# Patient Record
Sex: FEMALE | Race: BLACK OR AFRICAN AMERICAN | Employment: PART TIME | URBAN - METROPOLITAN AREA
[De-identification: names, ages, dates, MRNs, and addresses within clinical notes are randomized per-mention and may not be internally consistent; named-entity substitution may affect disease eponyms.]

---

## 2017-08-14 ENCOUNTER — OFFICE VISIT (OUTPATIENT)
Dept: FAMILY MEDICINE | Facility: CLINIC | Age: 22
End: 2017-08-14

## 2017-08-14 VITALS
HEART RATE: 76 BPM | RESPIRATION RATE: 16 BRPM | BODY MASS INDEX: 20.13 KG/M2 | WEIGHT: 132.4 LBS | OXYGEN SATURATION: 96 % | SYSTOLIC BLOOD PRESSURE: 121 MMHG | DIASTOLIC BLOOD PRESSURE: 73 MMHG | TEMPERATURE: 98.6 F

## 2017-08-14 DIAGNOSIS — L81.1 MELASMA: ICD-10-CM

## 2017-08-14 DIAGNOSIS — K59.04 CHRONIC IDIOPATHIC CONSTIPATION: Primary | ICD-10-CM

## 2017-08-14 RX ORDER — HYDROQUINONE 40 MG/G
CREAM TOPICAL 2 TIMES DAILY
Qty: 30 G | Refills: 3 | Status: SHIPPED | OUTPATIENT
Start: 2017-08-14 | End: 2018-03-16

## 2017-08-14 RX ORDER — POLYETHYLENE GLYCOL 3350 17 G/17G
1 POWDER, FOR SOLUTION ORAL DAILY
Qty: 510 G | Refills: 1 | Status: SHIPPED | OUTPATIENT
Start: 2017-08-14 | End: 2018-11-07

## 2017-08-14 NOTE — MR AVS SNAPSHOT
After Visit Summary   8/14/2017    Lissy Foote    MRN: [id number]           Patient Information     Date Of Birth          1995        Visit Information        Provider Department      8/14/2017 3:00 PM Herbie Espinoza MD [facility]        Today's Diagnoses     Chronic idiopathic constipation    -  1    Melasma          Care Instructions    Increase water especially in the morning.  Give some time to go to the bathroom.  Eat more fruits like prunes, pears, plums, and peaches--grapes, raisins, apples are good too.  Take Miralax with Tea every morning for two months.   Use Melasma cream twice a day.            Follow-ups after your visit        Who to contact     Please call your clinic at [phone] to:    Ask questions about your health    Make or cancel appointments    Discuss your medicines    Learn about your test results    Speak to your doctor   If you have compliments or concerns about an experience at your clinic, or if you wish to file a complaint, please contact [facility] Patient Relations at [phone] or email us at [email or web address]         Additional Information About Your Visit        MyChart Information     DoubleBeam gives you secure access to your electronic health record. If you see a primary care provider, you can also send messages to your care team and make appointments. If you have questions, please call your primary care clinic.  If you do not have a primary care provider, please call [phone] and they will assist you.      DoubleBeam is an electronic gateway that provides easy, online access to your medical records. With DoubleBeam, you can request a clinic appointment, read your test results, renew a prescription or communicate with your care team.     To access your existing account, please contact your [facility] or call [phone] for assistance.        Care EveryWhere ID      This is your Care EveryWhere ID. This could be used by other organizations to access your [place] medical records  [id number]        Your Vitals Were     Pulse Temperature Respirations Last Period Pulse Oximetry BMI (Body Mass Index)    76 98.6  F (37  C) (Oral) 16 08/08/2017 (Exact Date) 96% 20.13 kg/m2       Blood Pressure from Last 3 Encounters:   08/14/17 121/73   10/20/16 114/72   08/15/16 137/76    Weight from Last 3 Encounters:   08/14/17 132 lb 6.4 oz (60.1 kg)   10/20/16 126 lb (57.2 kg) (47 %)*   08/15/16 123 lb (55.8 kg) (42 %)*     * Growth percentiles are based on [facility] 2-20 Years data.              Today, you had the following     No orders found for display         Today's Medication Changes          These changes are accurate as of: 8/14/17  3:37 PM.  If you have any questions, ask your nurse or doctor.               Start taking these medicines.        Dose/Directions    hydroquinone 4 % Crea   Used for:  Melasma   Started by:  Herbie Espinoza MD        Externally apply topically 2 times daily   Quantity:  30 g   Refills:  3       polyethylene glycol powder   Commonly known as:  MIRALAX   Used for:  Chronic idiopathic constipation   Started by:  Herbie Espinoza MD        Dose:  1 capful   Take 17 g (1 capful) by mouth daily   Quantity:  510 g   Refills:  1            Where to get your medicines      These medications were sent to [facility] [address] [place] [facility] [address] [facility]  [address] [facility] [address]     Phone:  [phone]     hydroquinone 4 % Crea    polyethylene glycol powder                Primary Care Provider Office Phone # Fax #    Bria Kingsley [phone]       [address] [facility] [address]        Equal Access to Services     BRISA ESPINOZA : Pradip Bartlett, bebeto guy, anne-marie thorne. So [facility] [phone].    ATENCIÓN: Si habla español, tiene a mendoza disposición  servicios gratuitos de asistencia lingüística. Pan stroud [phone].    We comply with applicable federal civil rights laws and [place] laws. We do not discriminate on the basis of race, color, national origin, age, disability sex, sexual orientation or gender identity.            Thank you!     Thank you for choosing [facility]  for your care. Our goal is always to provide you with excellent care. Hearing back from our patients is one way we can continue to improve our services. Please take a few minutes to complete the written survey that you may receive in the mail after your visit with us. Thank you!             Your Updated Medication List - Protect others around you: Learn how to safely use, store and throw away your medicines at www.disposemymeds.org.          This list is accurate as of: 8/14/17  3:37 PM.  Always use your most recent med list.                   Brand Name Dispense Instructions for use Diagnosis    hydroquinone 4 % Crea     30 g    Externally apply topically 2 times daily    Melasma       polyethylene glycol powder    MIRALAX    510 g    Take 17 g (1 capful) by mouth daily    Chronic idiopathic constipation       psyllium 0.52 G capsule     100 capsule    Take 1 capsule (0.52 g) by mouth daily    Irritable bowel syndrome with both constipation and diarrhea       SUMAtriptan 25 MG tablet    IMITREX    18 tablet    Take 1-2 tablets (25-50 mg) by mouth at onset of headache for migraine May repeat dose in 2 hours.  Do not exceed 200 mg in 24 hours    Migraine without aura and without status migrainosus, not intractable

## 2017-08-14 NOTE — PATIENT INSTRUCTIONS
Increase water especially in the morning.  Give some time to go to the bathroom.  Eat more fruits like prunes, pears, plums, and peaches--grapes, raisins, apples are good too.  Take Miralax with Tea every morning for two months.   Use Melasma cream twice a day.

## 2017-08-14 NOTE — PROGRESS NOTES
HPI:       Lissy Foote is a 22 year old who presents for the following  Patient presents with:  Constipation: the last 2 weeks have been bad per patient. but she is having 3-4 bowl movements a week. Bloating after she eats over the last 2 months  Derm Problem: dark skin around her mouth. Dr. Kingsley told her to come in and talk about a skin cream to even out her skin tone.       Constipation     Onset: 2 weeks    Description:   Frequency of bowel movements: Every 1 days.  Stool consistency: hard    Progression of Symptoms:  worsening    Accompanying Signs & Symptoms:  Abdominal pain (cramping?):  YES feels distended the first part of the day    Blood in stool:  No   Rectal pain:  No   Nausea/vomiting:  No   Weight loss or gain:  No     History:   History of abdominal surgery: No     Precipitating factors:   Recent use of narcotics, anticholinergics, calcium channel blockers, antacids, or iron supplements:  No   Any other new medication?  No   Chronic Laxative Use:  No   Fruits of Vegetables per day  0  Therapies Tried and outcome: none         Concern: melasma   Description of the problem :had darkining around mouth for several years.  Wanted to see Derm but Dr Wild recommended a trial of a melasma medication first.  Unchanged issue.  No new constitutional symptoms--no weightloss or gain, fatigue, lactorrhea, change in periods or other concern.         Problem, Medication and Allergy Lists were reviewed and are current.  Ongoing issue with poor weight gain.  Improved recently.  Drink ensure frequently.  Likes cereal with milk and likes pizza.  Eating few veg or fruit.  Is taller than her parents.    Patient is an established patient of this clinic.         Review of Systems:   Review of Systems   As above       Physical Exam:   Patient Vitals for the past 24 hrs:   BP Temp Temp src Pulse Resp SpO2 Weight   08/14/17 1510 121/73 98.6  F (37  C) Oral 76 16 96 % 132 lb 6.4 oz (60.1 kg)     Body mass index is  20.13 kg/(m^2).  Vitals were reviewed and were normal     Physical Exam   Constitutional: She appears well-developed. No distress.   thin   HENT:   Head: Normocephalic and atraumatic.   Mouth/Throat: Oropharynx is clear and moist.   Eyes: Pupils are equal, round, and reactive to light. Right eye exhibits no discharge. Left eye exhibits no discharge.   Neck: Normal range of motion.   Cardiovascular: Normal rate, regular rhythm and normal heart sounds.  Exam reveals no gallop and no friction rub.    No murmur heard.  Pulmonary/Chest: Effort normal and breath sounds normal. No respiratory distress. She has no wheezes. She has no rales. She exhibits no tenderness.   Abdominal: Soft. Bowel sounds are normal. She exhibits no distension and no mass. There is no tenderness. There is no guarding.   Musculoskeletal: Normal range of motion.   Neurological: She is alert. No cranial nerve deficit.   Skin: Skin is warm and dry. Rash: does have some darkening of skin around mouth--no erythema. She is not diaphoretic. No erythema.         Results:     Results from last visit:  Orders Only on 08/16/2016   Component Date Value Ref Range Status     Specimen Description 08/16/2016 Feces   Final     H Pylori Antigen 08/16/2016    Final                    Value:Negative for Helicobacter pylori antigen by enzyme immunoassay. A negative   result indicates the absence of H. pylori antigen or that the level of antigen   is below the level of detection.       Micro Report Status 08/16/2016 FINAL 08/17/2016   Final     Assessment and Plan     1. Chronic idiopathic constipation  Diet changes recommended.  More water and fruit, miralax x 2 months.    - polyethylene glycol (MIRALAX) powder; Take 17 g (1 capful) by mouth daily  Dispense: 510 g; Refill: 1    2. Melasma  - hydroquinone 4 % CREA; Externally apply topically 2 times daily  Dispense: 30 g; Refill: 3  Will see how things go over next 2-4 weeks--if not improving will consider specialist  referral.  There are no discontinued medications.  Options for treatment and follow-up care were reviewed with the patient. Lissy Foote  engaged in the decision making process and verbalized understanding of the options discussed and agreed with the final plan.    Herbie Espinoza MD

## 2018-01-10 ENCOUNTER — MYC MEDICAL ADVICE (OUTPATIENT)
Dept: FAMILY MEDICINE | Facility: CLINIC | Age: 23
End: 2018-01-10

## 2018-01-10 DIAGNOSIS — M21.169 BOWING OF LEG, UNSPECIFIED LATERALITY: Primary | ICD-10-CM

## 2018-02-06 ENCOUNTER — OFFICE VISIT (OUTPATIENT)
Dept: FAMILY MEDICINE | Facility: CLINIC | Age: 23
End: 2018-02-06
Payer: COMMERCIAL

## 2018-02-06 ENCOUNTER — RADIANT APPOINTMENT (OUTPATIENT)
Dept: GENERAL RADIOLOGY | Facility: CLINIC | Age: 23
End: 2018-02-06
Attending: FAMILY MEDICINE
Payer: COMMERCIAL

## 2018-02-06 VITALS
RESPIRATION RATE: 16 BRPM | WEIGHT: 134.4 LBS | DIASTOLIC BLOOD PRESSURE: 80 MMHG | TEMPERATURE: 98.1 F | SYSTOLIC BLOOD PRESSURE: 115 MMHG | BODY MASS INDEX: 20.44 KG/M2 | HEART RATE: 70 BPM | OXYGEN SATURATION: 100 %

## 2018-02-06 DIAGNOSIS — M79.604 PAIN OF RIGHT LOWER EXTREMITY: Primary | ICD-10-CM

## 2018-02-06 DIAGNOSIS — M79.604 PAIN OF RIGHT LOWER EXTREMITY: ICD-10-CM

## 2018-02-06 DIAGNOSIS — R29.898 WEAKNESS OF BOTH HIPS: ICD-10-CM

## 2018-02-06 DIAGNOSIS — Z13.21 ENCOUNTER FOR VITAMIN DEFICIENCY SCREENING: ICD-10-CM

## 2018-02-06 DIAGNOSIS — D50.8 IRON DEFICIENCY ANEMIA SECONDARY TO INADEQUATE DIETARY IRON INTAKE: ICD-10-CM

## 2018-02-06 LAB
DEPRECATED CALCIDIOL+CALCIFEROL SERPL-MC: 28 UG/L (ref 20–75)
HCT VFR BLD AUTO: 34.8 % (ref 35–47)
HEMOGLOBIN: 10.6 G/DL (ref 11.7–15.7)
MCH RBC QN AUTO: 21.9 PG (ref 26.5–35)
MCHC RBC AUTO-ENTMCNC: 30.5 G/DL (ref 32–36)
MCV RBC AUTO: 71.9 FL (ref 78–100)
PLATELET # BLD AUTO: 234 K/UL (ref 150–450)
RBC # BLD AUTO: 4.84 M/UL (ref 3.8–5.2)
WBC # BLD AUTO: 5.2 K/UL (ref 4–11)

## 2018-02-06 NOTE — PROGRESS NOTES
SUBJECTIVE: Lissy Foote is a 22 year old female who reports getting right leg pain at times.  Told PMD that she's bowlegged, referral to sports med.  Deep into right lower leg.  Randomly happens, left LE only once or twice.  Goes doesn't last long, pain only a little bit.  No accident or injury.  Had braces for bowlegged legs as a child.  Walking- doesn't walk in a straight line.  Very young, had at [facility].  Born in [place], here in [place] since age 3.  Started today after sitting in exam room for short time.  Uncertain if she has had leg surgery.  [facility] [place] - signed release.  Legs start shaking with just sitting at times.  Taking vitamins for iron def.  Legs move around in class but not at night.  Told she had growing pains in the past, but now not growing any longer.  No cold sensation to legs, no rashes, no swelling.  Grandma with arthritis.    PAST MEDICAL, SOCIAL, SURGICAL AND FAMILY HISTORY: She  has no past medical history of Cancer (H); Diabetes (H); Thyroid disease; or Uncomplicated asthma.  She  has no past surgical history on file.  Her family history includes Thyroid Disease in her maternal grandmother and mother.  She reports that she has never smoked. She has never used smokeless tobacco. She reports that she does not drink alcohol or use illicit drugs.      ALLERGIES: She has No Known Allergies.    CURRENT MEDICATIONS: She has a current medication list which includes the following prescription(s): polyethylene glycol, hydroquinone, psyllium, and sumatriptan.     REVIEW OF SYSTEMS: 10 point review of systems is negative except as noted above.    EXAM:  /80  Pulse 70  Temp 98.1  F (36.7  C) (Oral)  Resp 16  Wt 134 lb 6.4 oz (61 kg)  SpO2 100%  BMI 20.44 kg/m2  CONSTITUTIONIAL: healthy, alert, no distress and cooperative  HEAD: Normocephalic. No masses, lesions, tenderness or abnormalities  ENT: ENT exam normal, no neck nodes or sinus tenderness  SKIN: no suspicious lesions or rashes  GAIT: normal and  mild valgus  Stance: normal and genu valgum  NEUROLOGIC: Normal muscle tone and strength, reflexes normal, sensation grossly normal.  PSYCHIATRIC: affect normal/bright and mentation appears normal.    MUSCULOSKELETAL: R>L leg pain  Inspection; no swelling, no ecchymosis, no deformity  Palpation: Tender: denies  Non-tender: proximal 1/3 tibia, middle 1/3 tibia, distal 1/3 tibia  Strength: gastrocsoleus: 5/5, anterior tibialis:  5/5, peroneals: 5/5  Special tests: negative hop test  Bilateral hip weakness- single leg squat    ASSESSMENT/PLAN:  Pt is a 21 yo [place] female with PMHx of lower extremity bracing as a child presenting with right lower leg pain  1. Pain in right lower extremity- denies RLS, no bony abnormality on x-ray  2. Bilateral hip weakness- needs glute strengthening and exercise program created  She is lacking necessary strength. Strongly encouraged PT  3. Iron def- CBC, still needs to have iron in her diet, on vitamins, we know this isn't absorbed well, vit C in form of OJ with iron on empty stomach  4. Vit D def in 2016- will check current levels  5. Pt concerned about leg alignment- getting records from [facility], not in care everywhere for some reason, she has stopped growing at this point, I would work on strengthening hips, glutes and leg musculature    Encounter Diagnoses   Name Primary?     Pain of right lower extremity Yes     Iron deficiency anemia secondary to inadequate dietary iron intake      Encounter for vitamin deficiency screening      Weakness of both hips      RTC 2 months    X-RAY INTERPRETATION:   X-Ray of the Right Tibia/Fibula: 2-view, ap, lateral   ordered and interpreted in the office today was negative for fracture, subluxation or joint space abnormality.

## 2018-02-06 NOTE — MR AVS SNAPSHOT
After Visit Summary   2/6/2018    Lissy Foote    MRN: [id number]           Patient Information     Date Of Birth          1995        Visit Information        Provider Department      2/6/2018 8:00 AM Ernestine Gorman MD [facility]        Today's Diagnoses     Pain of right lower extremity    -  1    Iron deficiency anemia secondary to inadequate dietary iron intake        Encounter for vitamin deficiency screening        Weakness of both hips           Follow-ups after your visit        Additional Services     [facility], PT, HAND AND CHIROPRACTIC REFERRAL - [facility]       **This order will print in the [facility] Scheduling Office**    Physical Therapy, Hand Therapy and Chiropractic Care are available through:    *[facility]  *[facility]  *[facility]    Call one number to schedule at any of the above locations: [phone].    Your provider has referred you to: Physical Therapy at [facility] or [facility]    Indication/Reason for Referral: Right > Left LE pain  Onset of Illness: on-going  Therapy Orders: Evaluate and Treat  Special Programs: None and Video Analysis  Special Request: None    Trevon Lester      Additional Comments for the Therapist or Chiropractor: history of leg braces as a child, [facility]    Please be aware that coverage of these services is subject to the terms and limitations of your health insurance plan.  Call member services at your health plan with any benefit or coverage questions.      Please bring the following to your appointment:    *Your personal calendar for scheduling future appointments  *Comfortable clothing                  Who to contact     Please call your clinic at [phone] to:    Ask questions about your health    Make or cancel appointments    Discuss your medicines    Learn about your test results    Speak to your doctor   If you have compliments or concerns about an experience  at your clinic, or if you wish to file a complaint, please contact [facility] Patient Relations at [phone] or email us at [email or web address]         Additional Information About Your Visit        Cydcorhart Information     Roadster gives you secure access to your electronic health record. If you see a primary care provider, you can also send messages to your care team and make appointments. If you have questions, please call your primary care clinic.  If you do not have a primary care provider, please call [phone] and they will assist you.      Roadster is an electronic gateway that provides easy, online access to your medical records. With Roadster, you can request a clinic appointment, read your test results, renew a prescription or communicate with your care team.     To access your existing account, please contact your [facility] or call [phone] for assistance.        Care EveryWhere ID     This is your Care EveryWhere ID. This could be used by other organizations to access your [place] medical records  [id number]        Your Vitals Were     Pulse Temperature Respirations Pulse Oximetry BMI (Body Mass Index)       70 98.1  F (36.7  C) (Oral) 16 100% 20.44 kg/m2        Blood Pressure from Last 3 Encounters:   02/06/18 115/80   08/14/17 121/73   10/20/16 114/72    Weight from Last 3 Encounters:   02/06/18 134 lb 6.4 oz (61 kg)   08/14/17 132 lb 6.4 oz (60.1 kg)   10/20/16 126 lb (57.2 kg) (47 %)*     * Growth percentiles are based on CDC 2-20 Years data.              We Performed the Following     CBC with Plt ([facility])     [facility], PT, HAND AND CHIROPRACTIC REFERRAL - [facility]     Vitamin D Level        Primary Care Provider Office Phone # Fax #    Bria Kingsley [phone]       [address] [facility] [address]        Equal Access to Services     BRISA ESPINOZA : bebeto Bland,  christian dueñasnoramichelle oneillmichelle anne-marie quinin hayaan adeeg kharash la'aan ah. So [facility] [phone].    ATENCIÓN: Si kaycee march, tiene a mendoza disposición servicios gratuitos de asistencia lingüística. Pan al [phone].    We comply with applicable federal civil rights laws and [place] laws. We do not discriminate on the basis of race, color, national origin, age, disability, sex, sexual orientation, or gender identity.            Thank you!     Thank you for choosing [facility]  for your care. Our goal is always to provide you with excellent care. Hearing back from our patients is one way we can continue to improve our services. Please take a few minutes to complete the written survey that you may receive in the mail after your visit with us. Thank you!             Your Updated Medication List - Protect others around you: Learn how to safely use, store and throw away your medicines at www.disposemymeds.org.          This list is accurate as of 2/6/18  9:09 AM.  Always use your most recent med list.                   Brand Name Dispense Instructions for use Diagnosis    hydroquinone 4 % Crea     30 g    Externally apply topically 2 times daily    Melasma       polyethylene glycol powder    MIRALAX    510 g    Take 17 g (1 capful) by mouth daily    Chronic idiopathic constipation       psyllium 0.52 G capsule     100 capsule    Take 1 capsule (0.52 g) by mouth daily    Irritable bowel syndrome with both constipation and diarrhea       SUMAtriptan 25 MG tablet    IMITREX    18 tablet    Take 1-2 tablets (25-50 mg) by mouth at onset of headache for migraine May repeat dose in 2 hours.  Do not exceed 200 mg in 24 hours    Migraine without aura and without status migrainosus, not intractable

## 2018-02-13 ENCOUNTER — OFFICE VISIT (OUTPATIENT)
Dept: FAMILY MEDICINE | Facility: CLINIC | Age: 23
End: 2018-02-13
Payer: COMMERCIAL

## 2018-02-13 VITALS
RESPIRATION RATE: 16 BRPM | HEART RATE: 94 BPM | TEMPERATURE: 99.8 F | DIASTOLIC BLOOD PRESSURE: 73 MMHG | BODY MASS INDEX: 20.19 KG/M2 | OXYGEN SATURATION: 100 % | SYSTOLIC BLOOD PRESSURE: 121 MMHG | WEIGHT: 132.8 LBS

## 2018-02-13 DIAGNOSIS — J11.1 INFLUENZA: Primary | ICD-10-CM

## 2018-02-13 RX ORDER — OSELTAMIVIR PHOSPHATE 75 MG/1
75 CAPSULE ORAL 2 TIMES DAILY
Qty: 10 CAPSULE | Refills: 0 | Status: SHIPPED | OUTPATIENT
Start: 2018-02-13 | End: 2018-02-18

## 2018-02-13 NOTE — PATIENT INSTRUCTIONS
Here is the plan from today's visit    1. Influenza  - oseltamivir (TAMIFLU) 75 MG capsule; Take 1 capsule (75 mg) by mouth 2 times daily for 5 days  Dispense: 10 capsule; Refill: 0    Please call or return to clinic if your symptoms don't go away or you get worse.    Thank you for coming to [facility] today.  Lab Testing:  **If you had lab testing today and your results are reassuring or normal they will be mailed to you or sent through [employer] within 7 days.   **If the lab tests need quick action we will call you with the results.  The phone number we will call with results is # [phone] (home) . If this is not the best number please call our clinic and change the number.  Medication Refills:  If you need any refills please call your pharmacy and they will contact us.   If you need to  your refill at a new pharmacy, please contact the new pharmacy directly. The new pharmacy will help you get your medications transferred faster.   Scheduling:  If you have any concerns about today's visit or wish to schedule another appointment please call our office during normal business hours [phone] (8-5:00 M-F)  If a referral was made to a [facility] and you don't get a call from central scheduling please call [phone].  If a Mammogram was ordered for you at [facility] call [phone] to schedule or change your appointment.  If you had an XRay/CT/Ultrasound/MRI ordered the number is [phone] to schedule or change your radiology appointment.   Medical Concerns:  If you have urgent medical concerns please call [phone] at any time of the day.        Influenza (Adult)    Influenza is also called the flu. It is a viral illness that affects the air passages of your lungs. It is different from the common cold. The flu can easily be passed from one to person to another. It may be spread through the air by coughing and sneezing. Or it can be spread by touching the sick  person and then touching your own eyes, nose, or mouth.  The flu starts 1 to 3 days after you are exposed to the flu virus. It may last for 1 to 2 weeks but many people feel tired or fatigued for many weeks afterward. You usually don t need to take antibiotics unless you have a complication. This might be an ear or sinus infection or pneumonia.  Symptoms of the flu may be mild or severe. They can include extreme tiredness (wanting to stay in bed all day), chills, fevers, muscle aches, soreness with eye movement, headache, and a dry, hacking cough.  Home care  Follow these guidelines when caring for yourself at home:    Avoid being around cigarette smoke, whether yours or other people s.    Acetaminophen or ibuprofen will help ease your fever, muscle aches, and headache. Don t give aspirin to anyone younger than 18 who has the flu. Aspirin can harm the liver.    Nausea and loss of appetite are common with the flu. Eat light meals. Drink 6 to 8 glasses of liquids every day. Good choices are water, sport drinks, soft drinks without caffeine, juices, tea, and soup. Extra fluids will also help loosen secretions in your nose and lungs.    Over-the-counter cold medicines will not make the flu go away faster. But the medicines may help with coughing, sore throat, and congestion in your nose and sinuses. Don t use a decongestant if you have high blood pressure.    Stay home until your fever has been gone for at least 24 hours without using medicine to reduce fever.  Follow-up care  Follow up with your healthcare provider, or as advised, if you are not getting better over the next week.  If you are age 65 or older, talk with your provider about getting a pneumococcal vaccine every 5 years. You should also get this vaccine if you have chronic asthma or COPD. All adults should get a flu vaccine every fall. Ask your provider about this.  When to seek medical advice  Call your healthcare provider right away if any of these  occur:    Cough with lots of colored mucus (sputum) or blood in your mucus    Chest pain, shortness of breath, wheezing, or trouble breathing    Severe headache, or face, neck, or ear pain    New rash with fever    Fever of 100.4 F (38 C) or higher, or as directed by your healthcare provider    Confusion, behavior change, or seizure    Severe weakness or dizziness    You get a new fever or cough after getting better for a few days  Date Last Reviewed: 1/1/2017 2000-2017 [employer]. [address] [place] [address]. All rights reserved. This information is not intended as a substitute for professional medical care. Always follow your healthcare professional's instructions.

## 2018-02-13 NOTE — PROGRESS NOTES
"      HPI:       Lissy Foote is a 22 year old who presents for the following    Acute Illness   Concerns:  \"sick\"  When did it start?  <2 days ago  Is it getting better, worse or staying the same? unchanged    Fatigue/Achiness?: YES    Fever?:  YES     Chills/Sweats?:  YES    Headache (location?) YES     Sinus Pressure?:No     Eye redness/Discharge?: No     Ear Pain?: No     Runny nose?:  YES - clear     Congestion?:  YES     Sore Throat?:  YES - able to swallow    Respiratory    Cough?:  YES-non-productive, productive of clear sputum    Wheeze?: No     GI/    Decreased Appetite?: No     Nausea?:No     Vomiting?: No     Diarrhea?:  No     Dysuria/Frequency?.:No       Any Illness Exposure?:  YES - works at     Any foreign travel or contact with anyone ill who travelled abroad? No     Smoker?:  No     Therapies Tried and outcome: tylenol, Details: lessens symptoms some      Problem, Medication and Allergy Lists were reviewed and are current..    Patient is an established patient of this clinic.         Physical Exam:     Vitals:    02/13/18 1308   BP: 121/73   Pulse: 94   Resp: 16   Temp: 99.8  F (37.7  C)   TempSrc: Oral   SpO2: 100%   Weight: 132 lb 12.8 oz (60.2 kg)     Body mass index is 20.19 kg/(m^2).  Vitals were reviewed and were normal  GENERAL: no distress and fatigued  EYES: Eyes grossly normal to inspection, extraocular movements - intact, and PERRL  HENT: TM's pearly grey, normal light reflex bilateral, rhinorrhea clear and tonsillar erythema  NECK: no tenderness, no adenopathy, no asymmetry, no masses, no stiffness; thyroid- normal to palpation  RESP: lungs clear to auscultation - no rales, no rhonchi, no wheezes  CV: regular rates and rhythm, normal S1 S2, no S3 or S4 and no murmur, no click or rub -  ABDOMEN: soft, no tenderness, no  hepatosplenomegaly, no masses, normal bowel sounds  MS: extremities- no gross deformities noted, no edema  SKIN: no suspicious lesions, no rashes  BACK: no CVA " tenderness, no paralumbar tenderness  PSYCH: Alert and oriented times 3; coherent speech, normal rate and volume, able to articulate logical thoughts, able to abstract reason, no tangential thoughts, no hallucinations or delusions. Affect is normal.  LYMPHATICS: normal ant/post cervical, supraclavicular nodes      Results:     None     Assessment and Plan     1. Influenza  Patient with findings consistent with influenza.  Symptoms less than 48 hours.  Will treat with the following and recommend Symptomatic car  - oseltamivir (TAMIFLU) 75 MG capsule; Take 1 capsule (75 mg) by mouth 2 times daily for 5 days  Dispense: 10 capsule; Refill: 0    Return to clinic if symptoms not resolving or if worsen at any time.    Options for treatment and follow-up care were reviewed with the patient. Lissy Foote  engaged in the decision making process and verbalized understanding of the options discussed and agreed with the final plan.    Fidencio Walker MD

## 2018-02-13 NOTE — LETTER
RETURN TO WORK/SCHOOL FORM    2/13/2018    Re: Lissy Foote  1995      To Whom It May Concern:     Lissy Foote was seen in clinic today for influenza.  She may return to school without restrictions once her fever is resolved for 24 hours.     Sincerely,        Fidencio Walker MD, FAAFP  2/13/2018 1:22 PM

## 2018-02-13 NOTE — MR AVS SNAPSHOT
After Visit Summary   2/13/2018    Lissy Foote    MRN: [id number]           Patient Information     Date Of Birth          1995        Visit Information        Provider Department      2/13/2018 1:00 PM Fidencio Walker MD [facility]        Today's Diagnoses     Influenza    -  1      Care Instructions    Here is the plan from today's visit    1. Influenza  - oseltamivir (TAMIFLU) 75 MG capsule; Take 1 capsule (75 mg) by mouth 2 times daily for 5 days  Dispense: 10 capsule; Refill: 0    Please call or return to clinic if your symptoms don't go away or you get worse.    Thank you for coming to [facility] today.  Lab Testing:  **If you had lab testing today and your results are reassuring or normal they will be mailed to you or sent through [employer] within 7 days.   **If the lab tests need quick action we will call you with the results.  The phone number we will call with results is # [phone] (home) . If this is not the best number please call our clinic and change the number.  Medication Refills:  If you need any refills please call your pharmacy and they will contact us.   If you need to  your refill at a new pharmacy, please contact the new pharmacy directly. The new pharmacy will help you get your medications transferred faster.   Scheduling:  If you have any concerns about today's visit or wish to schedule another appointment please call our office during normal business hours [phone] (8-5:00 M-F)  If a referral was made to a [facility] and you don't get a call from central scheduling please call [phone].  If a Mammogram was ordered for you at [facility] call [phone] to schedule or change your appointment.  If you had an XRay/CT/Ultrasound/MRI ordered the number is [phone] to schedule or change your radiology appointment.   Medical Concerns:  If you have urgent medical concerns please call [phone] at any  time of the day.        Influenza (Adult)    Influenza is also called the flu. It is a viral illness that affects the air passages of your lungs. It is different from the common cold. The flu can easily be passed from one to person to another. It may be spread through the air by coughing and sneezing. Or it can be spread by touching the sick person and then touching your own eyes, nose, or mouth.  The flu starts 1 to 3 days after you are exposed to the flu virus. It may last for 1 to 2 weeks but many people feel tired or fatigued for many weeks afterward. You usually don t need to take antibiotics unless you have a complication. This might be an ear or sinus infection or pneumonia.  Symptoms of the flu may be mild or severe. They can include extreme tiredness (wanting to stay in bed all day), chills, fevers, muscle aches, soreness with eye movement, headache, and a dry, hacking cough.  Home care  Follow these guidelines when caring for yourself at home:    Avoid being around cigarette smoke, whether yours or other people s.    Acetaminophen or ibuprofen will help ease your fever, muscle aches, and headache. Don t give aspirin to anyone younger than 18 who has the flu. Aspirin can harm the liver.    Nausea and loss of appetite are common with the flu. Eat light meals. Drink 6 to 8 glasses of liquids every day. Good choices are water, sport drinks, soft drinks without caffeine, juices, tea, and soup. Extra fluids will also help loosen secretions in your nose and lungs.    Over-the-counter cold medicines will not make the flu go away faster. But the medicines may help with coughing, sore throat, and congestion in your nose and sinuses. Don t use a decongestant if you have high blood pressure.    Stay home until your fever has been gone for at least 24 hours without using medicine to reduce fever.  Follow-up care  Follow up with your healthcare provider, or as advised, if you are not getting better over the next  week.  If you are age 65 or older, talk with your provider about getting a pneumococcal vaccine every 5 years. You should also get this vaccine if you have chronic asthma or COPD. All adults should get a flu vaccine every fall. Ask your provider about this.  When to seek medical advice  Call your healthcare provider right away if any of these occur:    Cough with lots of colored mucus (sputum) or blood in your mucus    Chest pain, shortness of breath, wheezing, or trouble breathing    Severe headache, or face, neck, or ear pain    New rash with fever    Fever of 100.4 F (38 C) or higher, or as directed by your healthcare provider    Confusion, behavior change, or seizure    Severe weakness or dizziness    You get a new fever or cough after getting better for a few days  Date Last Reviewed: 1/1/2017 2000-2017 [employer]. [address] [place] [address]. All rights reserved. This information is not intended as a substitute for professional medical care. Always follow your healthcare professional's instructions.                Follow-ups after your visit        Who to contact     Please call your clinic at [phone] to:    Ask questions about your health    Make or cancel appointments    Discuss your medicines    Learn about your test results    Speak to your doctor            Additional Information About Your Visit        [employer] Information     [employer] gives you secure access to your electronic health record. If you see a primary care provider, you can also send messages to your care team and make appointments. If you have questions, please call your primary care clinic.  If you do not have a primary care provider, please call [phone] and they will assist you.      [employer] is an electronic gateway that provides easy, online access to your medical records. With [employer], you can request a clinic appointment, read your test results, renew a prescription or communicate with your care  team.     To access your existing account, please contact your [facility] or call [phone] for assistance.        Care EveryWhere ID     This is your Care EveryWhere ID. This could be used by other organizations to access your [facility] medical records  [id number]        Your Vitals Were     Pulse Temperature Respirations Last Period Pulse Oximetry BMI (Body Mass Index)    94 99.8  F (37.7  C) (Oral) 16 01/29/2018 (Approximate) 100% 20.19 kg/m2       Blood Pressure from Last 3 Encounters:   02/13/18 121/73   02/06/18 115/80   08/14/17 121/73    Weight from Last 3 Encounters:   02/13/18 132 lb 12.8 oz (60.2 kg)   02/06/18 134 lb 6.4 oz (61 kg)   08/14/17 132 lb 6.4 oz (60.1 kg)              Today, you had the following     No orders found for display         Today's Medication Changes          These changes are accurate as of 2/13/18  1:22 PM.  If you have any questions, ask your nurse or doctor.               Start taking these medicines.        Dose/Directions    oseltamivir 75 MG capsule   Commonly known as:  TAMIFLU   Used for:  Influenza   Started by:  Fidencio Walker MD        Dose:  75 mg   Take 1 capsule (75 mg) by mouth 2 times daily for 5 days   Quantity:  10 capsule   Refills:  0            Where to get your medicines      These medications were sent to [facility] [place], [place] - [address] [place] [id number]     Phone:  [phone]     oseltamivir 75 MG capsule                Primary Care Provider Office Phone # Fax #    Bria Kingsley [phone]       [address] [facility] [address]        Equal Access to Services     [facility] AH: Hadii brie villanueva hadasho Soomaali, waaxda luqadaha, qaybta kaalmada adeegyada, anne-marie mcintoshn paula lares. So [facility] [phone].    ATENCIÓN: Si habla español, tiene a mendoza disposición servicios gratuitos de asistencia lingüística. Llame al [phone].    We  comply with applicable federal civil rights laws and [place] laws. We do not discriminate on the basis of race, color, national origin, age, disability, sex, sexual orientation, or gender identity.            Thank you!     Thank you for choosing [facility]  for your care. Our goal is always to provide you with excellent care. Hearing back from our patients is one way we can continue to improve our services. Please take a few minutes to complete the written survey that you may receive in the mail after your visit with us. Thank you!             Your Updated Medication List - Protect others around you: Learn how to safely use, store and throw away your medicines at www.disposemymeds.org.          This list is accurate as of 2/13/18  1:22 PM.  Always use your most recent med list.                   Brand Name Dispense Instructions for use Diagnosis    hydroquinone 4 % Crea     30 g    Externally apply topically 2 times daily    Melasma       oseltamivir 75 MG capsule    TAMIFLU    10 capsule    Take 1 capsule (75 mg) by mouth 2 times daily for 5 days    Influenza       polyethylene glycol powder    MIRALAX    510 g    Take 17 g (1 capful) by mouth daily    Chronic idiopathic constipation       psyllium 0.52 G capsule     100 capsule    Take 1 capsule (0.52 g) by mouth daily    Irritable bowel syndrome with both constipation and diarrhea       SUMAtriptan 25 MG tablet    IMITREX    18 tablet    Take 1-2 tablets (25-50 mg) by mouth at onset of headache for migraine May repeat dose in 2 hours.  Do not exceed 200 mg in 24 hours    Migraine without aura and without status migrainosus, not intractable

## 2018-03-16 ENCOUNTER — OFFICE VISIT (OUTPATIENT)
Dept: FAMILY MEDICINE | Facility: CLINIC | Age: 23
End: 2018-03-16
Payer: COMMERCIAL

## 2018-03-16 VITALS
WEIGHT: 126 LBS | TEMPERATURE: 98.1 F | HEART RATE: 64 BPM | DIASTOLIC BLOOD PRESSURE: 85 MMHG | OXYGEN SATURATION: 99 % | RESPIRATION RATE: 20 BRPM | BODY MASS INDEX: 19.16 KG/M2 | SYSTOLIC BLOOD PRESSURE: 130 MMHG

## 2018-03-16 DIAGNOSIS — K58.2 IRRITABLE BOWEL SYNDROME WITH BOTH CONSTIPATION AND DIARRHEA: ICD-10-CM

## 2018-03-16 DIAGNOSIS — L70.0 ACNE VULGARIS: ICD-10-CM

## 2018-03-16 DIAGNOSIS — L30.9 DERMATITIS: Primary | ICD-10-CM

## 2018-03-16 RX ORDER — DOXYCYCLINE 100 MG/1
100 CAPSULE ORAL DAILY
Qty: 30 CAPSULE | Refills: 1 | Status: SHIPPED | OUTPATIENT
Start: 2018-03-16 | End: 2018-05-15

## 2018-03-16 RX ORDER — PETROLATUM,WHITE 41 %
OINTMENT (GRAM) TOPICAL PRN
Qty: 396 G | Refills: 1 | Status: SHIPPED | OUTPATIENT
Start: 2018-03-16 | End: 2018-11-07

## 2018-03-16 ASSESSMENT — ENCOUNTER SYMPTOMS
FEVER: 0
VOMITING: 0
COUGH: 0
SHORTNESS OF BREATH: 0
APPETITE CHANGE: 0
SORE THROAT: 1
ABDOMINAL PAIN: 1
CONSTIPATION: 1
CHILLS: 0
NAUSEA: 0
ARTHRALGIAS: 0
JOINT SWELLING: 0
DIARRHEA: 0
HEADACHES: 0

## 2018-03-16 NOTE — PATIENT INSTRUCTIONS
Here is the plan from today's visit    1. Dermatitis  - Emollient (EUCERIN CALMING DAILY MOIST) CREA; Externally apply topically as needed  Dispense: 396 g; Refill: 1  - doxycycline monohydrate 100 MG capsule; Take 1 capsule (100 mg) by mouth daily  Dispense: 30 capsule; Refill: 1    2. Acne vulgaris  - Emollient (EUCERIN CALMING DAILY MOIST) CREA; Externally apply topically as needed  Dispense: 396 g; Refill: 1  - doxycycline monohydrate 100 MG capsule; Take 1 capsule (100 mg) by mouth daily  Dispense: 30 capsule; Refill: 1    3. Irritable bowel syndrome with both constipation and diarrhea  Stop taking miralax if loose stools, but can continue fiber capsule.  - psyllium 0.52 G capsule; Take 1 capsule (0.52 g) by mouth daily  Dispense: 100 capsule; Refill: 3      Please call or return to clinic if your symptoms don't go away.    Follow up plan  Follow up if you are not improving in the next 2  weeks.    Thank you for coming to [facility] today.  Lab Testing:  **If you had lab testing today and your results are reassuring or normal they will be mailed to you or sent through [employer] within 7 days.   **If the lab tests need quick action we will call you with the results.  The phone number we will call with results is # [phone] (home) . If this is not the best number please call our clinic and change the number.  Medication Refills:  If you need any refills please call your pharmacy and they will contact us.   If you need to  your refill at a new pharmacy, please contact the new pharmacy directly. The new pharmacy will help you get your medications transferred faster.   Scheduling:  If you have any concerns about today's visit or wish to schedule another appointment please call our office during normal business hours [phone] (8-5:00 M-F)  If a referral was made to a [facility] and you don't get a call from central scheduling please call [phone].  If a Mammogram was  ordered for you at [facility] call [phone] to schedule or change your appointment.  If you had an XRay/CT/Ultrasound/MRI ordered the number is [phone] to schedule or change your radiology appointment.   Medical Concerns:  If you have urgent medical concerns please call [phone] at any time of the day.    Jennie Marie MD

## 2018-03-16 NOTE — PROGRESS NOTES
HPI:       Lissy Foote is a 22 year old who presents for the following  Patient presents with:  Derm Problem: mouth, increased bumps,itchy,warm touch    Rash/Lesion on face  Onset: 2-3 months    Description:   Location: entire face; started around mouth, but spread to chin, neck, and forehead  Color: darker after they heal  Character: round, raised, burning, and tingly  Itching (Pruritis): Yes  Pain?:no    Progression of Symptoms:  Worsening over time    Accompanying Signs & Symptoms:  Fever: no  Body aches or joint pain:  no  Sore throat symptoms:Yes Details: yesterday, but has resolved  Recent cold symptoms: no    History:   Previous similar rash: no    Precipitating factors:   Exposure to similar rash: no  New exposures: Yes Details: Started using hydroquinone in August 2017 for her melasma around her mouth.  Used two tubes of the medication and the stopped due to the eruption of bumps around her mouth. Also has bumps on chin.  About 1 month ago, it started to spread to her right cheek and forehead.  Her face is hot, itchy, tight, and tingling. She states that her skin gets Itchy and she has random tingling before it started spreading. No new soaps or detergents.  Recent travel: no    New Medication: This cream and she has also tried some skin exfoliants and chemical peels on her face which has made it worse    What makes it better?:   baby cream that makes it feel less itchy and tight  Therapies Tried and outcome:  Exfoliant and chemical peels, Details: thinks she made it worse      Concern: constipation   Description of the problem :More constipation in the past year.  Has stopped taking her miralax. Drinks plenty of water and her diet contains adequate fruits and vegetables.  Will try restarting miralax and fiber capsules again.     Problem, Medication and Allergy Lists were reviewed and are current.  Patient is an established patient of this clinic.         Review of Systems:   Review of Systems    Constitutional: Negative for appetite change, chills and fever.   HENT: Positive for sore throat (yesterday that has resolved). Negative for congestion.    Eyes: Negative for visual disturbance.   Respiratory: Negative for cough and shortness of breath.    Cardiovascular: Negative for chest pain.   Gastrointestinal: Positive for abdominal pain (crampy) and constipation. Negative for diarrhea, nausea and vomiting.   Musculoskeletal: Negative for arthralgias and joint swelling.   Skin: Positive for rash (facial).   Neurological: Negative for headaches.             Physical Exam:   Patient Vitals for the past 24 hrs:   BP Temp Temp src Pulse Resp SpO2 Weight   03/16/18 1128 130/85 98.1  F (36.7  C) Oral 64 20 99 % 126 lb (57.2 kg)     Body mass index is 19.16 kg/(m^2).  Vitals were reviewed and were normal     Physical Exam   Constitutional: She appears well-developed and well-nourished. No distress.   HENT:   Head: Normocephalic and atraumatic.   Eyes: Conjunctivae are normal.   Neck: Normal range of motion. Neck supple.   Cardiovascular: Normal rate.    Pulmonary/Chest: Effort normal.   Skin: Skin is warm.   Raised comedomes on temples, forehead and lower chin. Round raised perioral macules that are also underneath her chin/upper neck.   Psychiatric: She has a normal mood and affect. Her behavior is normal. Judgment and thought content normal.         Results:     none  Assessment and Plan     Lissy was seen today for derm problem.    Diagnoses and all orders for this visit:    Dermatitis and Acne vulgaris- Exam findings most likely dermatitis related to hydroquinone cream she used for melasma (perioral) as well as acne (temples, forehead, and chin). Spoke about using sensitive skin lotion (Eucerin), cleanser, detergents, etc. and products that don't have any fragrance. Doxycyline can treat both dermatitis and acne.  Return in 2 weeks for eas  -     Emollient (EUCERIN CALMING DAILY MOIST) CREA; Externally apply  topically as needed  -     doxycycline monohydrate 100 MG capsule; Take 1 capsule (100 mg) by mouth daily for 2 months    Irritable bowel syndrome with both constipation and diarrhea - More constipation issues this year. Has stopped miralax.  Drinks adequate amount of water and fruits and veggies in diet. Stopped taking miralax, will start again as well as fiber capsules. Since starting antibiotics for 2 month course, can stop taking miralax if loose stools, but can continue fiber capsule.  -     psyllium 0.52 G capsule; Take 1 capsule (0.52 g) by mouth daily      There are no discontinued medications.  Options for treatment and follow-up care were reviewed with the patient. Lissy Foote  engaged in the decision making process and verbalized understanding of the options discussed and agreed with the final plan.    Jennie Marie MD

## 2018-03-16 NOTE — MR AVS SNAPSHOT
After Visit Summary   3/16/2018    Lissy Foote    MRN: [id number]           Patient Information     Date Of Birth          1995        Visit Information        Provider Department      3/16/2018 11:20 AM Jennie Marie MD [facility]        Today's Diagnoses     Other atopic dermatitis    -  1    Acne vulgaris        Irritable bowel syndrome with both constipation and diarrhea          Care Instructions    Here is the plan from today's visit    1. Other atopic dermatitis  - Emollient (EUCERIN CALMING DAILY MOIST) CREA; Externally apply topically as needed  Dispense: 396 g; Refill: 1  - doxycycline monohydrate 100 MG capsule; Take 1 capsule (100 mg) by mouth daily  Dispense: 30 capsule; Refill: 1    2. Acne vulgaris  - Emollient (EUCERIN CALMING DAILY MOIST) CREA; Externally apply topically as needed  Dispense: 396 g; Refill: 1  - doxycycline monohydrate 100 MG capsule; Take 1 capsule (100 mg) by mouth daily  Dispense: 30 capsule; Refill: 1    3. Irritable bowel syndrome with both constipation and diarrhea  Stop taking miralax if loose stools, but can continue fiber capsule.  - psyllium 0.52 G capsule; Take 1 capsule (0.52 g) by mouth daily  Dispense: 100 capsule; Refill: 3      Please call or return to clinic if your symptoms don't go away.    Follow up plan  Follow up if you are not improving in the next 2  weeks.    Thank you for coming to [facility] today.  Lab Testing:  **If you had lab testing today and your results are reassuring or normal they will be mailed to you or sent through [employer] within 7 days.   **If the lab tests need quick action we will call you with the results.  The phone number we will call with results is # [phone] (home) . If this is not the best number please call our clinic and change the number.  Medication Refills:  If you need any refills please call your pharmacy and they will contact us.   If you need to  your refill at a new  pharmacy, please contact the new pharmacy directly. The new pharmacy will help you get your medications transferred faster.   Scheduling:  If you have any concerns about today's visit or wish to schedule another appointment please call our office during normal business hours [phone] (8-5:00 M-F)  If a referral was made to a [facility] and you don't get a call from central scheduling please call [phone].  If a Mammogram was ordered for you at [facility] call [phone] to schedule or change your appointment.  If you had an XRay/CT/Ultrasound/MRI ordered the number is [phone] to schedule or change your radiology appointment.   Medical Concerns:  If you have urgent medical concerns please call [phone] at any time of the day.    Jennie Marie MD          Follow-ups after your visit        Your next 10 appointments already scheduled     Apr 30, 2018  6:30 PM CDT   (Arrive by 6:15 PM)   New Patient Visit with TR[facility]G [facility] ([facility] [place])    [address] [place] [address] [phone]              Who to contact     Please call your clinic at [phone] to:    Ask questions about your health    Make or cancel appointments    Discuss your medicines    Learn about your test results    Speak to your doctor            Additional Information About Your Visit        MyChart Information     [employer] gives you secure access to your electronic health record. If you see a primary care provider, you can also send messages to your care team and make appointments. If you have questions, please call your primary care clinic.  If you do not have a primary care provider, please call [phone] and they will assist you.      [employer] is an electronic gateway that provides easy, online access to your medical records. With [employer], you can request a clinic appointment, read your test results,  renew a prescription or communicate with your care team.     To access your existing account, please contact your [facility] or call [phone] for assistance.        Care EveryWhere ID     This is your Care EveryWhere ID. This could be used by other organizations to access your [facility] medical records  [id number]        Your Vitals Were     Pulse Temperature Respirations Last Period Pulse Oximetry Breastfeeding?    64 98.1  F (36.7  C) (Oral) 20 03/02/2018 (Approximate) 99% No    BMI (Body Mass Index)                   19.16 kg/m2            Blood Pressure from Last 3 Encounters:   03/16/18 130/85   02/13/18 121/73   02/06/18 115/80    Weight from Last 3 Encounters:   03/16/18 126 lb (57.2 kg)   02/13/18 132 lb 12.8 oz (60.2 kg)   02/06/18 134 lb 6.4 oz (61 kg)              Today, you had the following     No orders found for display         Today's Medication Changes          These changes are accurate as of 3/16/18 12:06 PM.  If you have any questions, ask your nurse or doctor.               Start taking these medicines.        Dose/Directions    doxycycline monohydrate 100 MG capsule   Used for:  Other atopic dermatitis   Started by:  Jennie Marie MD        Dose:  100 mg   Take 1 capsule (100 mg) by mouth daily   Quantity:  30 capsule   Refills:  1       EUCERIN CALMING DAILY MOIST Crea   Used for:  Other atopic dermatitis   Started by:  Jennie Marie MD        Externally apply topically as needed   Quantity:  396 g   Refills:  1            Where to get your medicines      These medications were sent to [facility] [place], [place] - [address] [facility] [address]     Phone:  [phone]     doxycycline monohydrate 100 MG capsule    EUCERIN CALMING DAILY MOIST Crea    psyllium 0.52 G capsule                Primary Care Provider Office Phone # Fax #    Bria Kingsley [phone]       [address] [facility]  [place] [address]        Equal Access to Services     [facility] ALEXIS : Hadii brie villanueva sandra Bartlett, waaxda luqadaha, qaybta kaalmada paulatoniemichelle, anne-marie valenciawinnieshaan lares. So [facility] [phone].    ATENCIÓN: Si habla español, tiene a mendoza disposición servicios gratuitos de asistencia lingüística. Llame al [phone].    We comply with applicable federal civil rights laws and [place] laws. We do not discriminate on the basis of race, color, national origin, age, disability, sex, sexual orientation, or gender identity.            Thank you!     Thank you for choosing [facility]  for your care. Our goal is always to provide you with excellent care. Hearing back from our patients is one way we can continue to improve our services. Please take a few minutes to complete the written survey that you may receive in the mail after your visit with us. Thank you!             Your Updated Medication List - Protect others around you: Learn how to safely use, store and throw away your medicines at www.disposemymeds.org.          This list is accurate as of 3/16/18 12:06 PM.  Always use your most recent med list.                   Brand Name Dispense Instructions for use Diagnosis    doxycycline monohydrate 100 MG capsule     30 capsule    Take 1 capsule (100 mg) by mouth daily    Other atopic dermatitis       EUCERIN CALMING DAILY MOIST Crea     396 g    Externally apply topically as needed    Other atopic dermatitis       hydroquinone 4 % Crea     30 g    Externally apply topically 2 times daily    Melasma       polyethylene glycol powder    MIRALAX    510 g    Take 17 g (1 capful) by mouth daily    Chronic idiopathic constipation       psyllium 0.52 G capsule     100 capsule    Take 1 capsule (0.52 g) by mouth daily    Irritable bowel syndrome with both constipation and diarrhea       SUMAtriptan 25 MG tablet    IMITREX    18 tablet    Take 1-2 tablets (25-50 mg) by mouth at onset of headache for  migraine May repeat dose in 2 hours.  Do not exceed 200 mg in 24 hours    Migraine without aura and without status migrainosus, not intractable

## 2018-03-19 NOTE — PROGRESS NOTES
Preceptor Attestation:   Patient seen, evaluated and discussed with the resident. I have verified the content of the note, which accurately reflects my assessment of the patient and the plan of care.   Supervising Physician:  Herbie Espinoza MD

## 2018-03-28 ENCOUNTER — TELEPHONE (OUTPATIENT)
Dept: DERMATOLOGY | Facility: CLINIC | Age: 23
End: 2018-03-28

## 2018-03-28 NOTE — TELEPHONE ENCOUNTER
Left a voicemail offering an earlier appointment with our PA, Adriana Suarez. Clinic number provided to call back if an earlier appointment is desired.

## 2018-03-29 ENCOUNTER — THERAPY VISIT (OUTPATIENT)
Dept: PHYSICAL THERAPY | Facility: CLINIC | Age: 23
End: 2018-03-29
Payer: COMMERCIAL

## 2018-03-29 DIAGNOSIS — M62.81 GENERALIZED MUSCLE WEAKNESS: Primary | ICD-10-CM

## 2018-03-29 PROCEDURE — 97110 THERAPEUTIC EXERCISES: CPT | Mod: GP | Performed by: PHYSICAL THERAPIST

## 2018-03-29 PROCEDURE — 97161 PT EVAL LOW COMPLEX 20 MIN: CPT | Mod: GP | Performed by: PHYSICAL THERAPIST

## 2018-03-29 ASSESSMENT — ACTIVITIES OF DAILY LIVING (ADL)
DEEP_SQUATTING: NO DIFFICULTY AT ALL
ROLLING_OVER_IN_BED: NO DIFFICULTY AT ALL
STANDING_FOR_15_MINUTES: NO DIFFICULTY AT ALL
SITTING_FOR_15_MINUTES: MODERATE DIFFICULTY
WALKING_INITIALLY: MODERATE DIFFICULTY
STEPPING_UP_AND_DOWN_CURBS: SLIGHT DIFFICULTY
WALKING_15_MINUTES_OR_GREATER: MODERATE DIFFICULTY
HEAVY_WORK: SLIGHT DIFFICULTY
GETTING_INTO_AND_OUT_OF_AN_AVERAGE_CAR: NO DIFFICULTY AT ALL
GOING_UP_1_FLIGHT_OF_STAIRS: NO DIFFICULTY AT ALL
WALKING_UP_STEEP_HILLS: NO DIFFICULTY AT ALL
LIGHT_TO_MODERATE_WORK: SLIGHT DIFFICULTY
PUTTING_ON_SOCKS_AND_SHOES: SLIGHT DIFFICULTY
HOW_WOULD_YOU_RATE_YOUR_CURRENT_LEVEL_OF_FUNCTION_DURING_YOUR_USUAL_ACTIVITIES_OF_DAILY_LIVING_FROM_0_TO_100_WITH_100_BEING_YOUR_LEVEL_OF_FUNCTION_PRIOR_TO_YOUR_HIP_PROBLEM_AND_0_BEING_THE_INABILITY_TO_PERFORM_ANY_OF_YOUR_USUAL_DAILY_ACTIVITIES?: 87
HOS_ADL_ITEM_SCORE_TOTAL: 54
WALKING_APPROXIMATELY_10_MINUTES: MODERATE DIFFICULTY
GOING_DOWN_1_FLIGHT_OF_STAIRS: NO DIFFICULTY AT ALL
HOS_ADL_SCORE(%): 84.38
TWISTING/PIVOTING_ON_INVOLVED_LEG: NO DIFFICULTY AT ALL
HOS_ADL_HIGHEST_POTENTIAL_SCORE: 64
GETTING_INTO_AND_OUT_OF_A_BATHTUB: NO DIFFICULTY AT ALL
HOS_ADL_COUNT: 16
WALKING_DOWN_STEEP_HILLS: SLIGHT DIFFICULTY

## 2018-03-29 NOTE — MR AVS SNAPSHOT
After Visit Summary   3/29/2018    Lissy Foote    MRN: [id number]           Patient Information     Date Of Birth          1995        Visit Information        Provider Department      3/29/2018 12:10 PM Sujatha Wu PT [facility]        Today's Diagnoses     Generalized muscle weakness    -  1       Follow-ups after your visit        Your next 10 appointments already scheduled     Apr 05, 2018 11:00 AM CDT   LIS Extremity with Sujatha Wu PT   [facility] ([facility])    [address] [place] [address]   [phone]            Apr 12, 2018 11:00 AM CDT   LIS Extremity with Sujatha Wu PT   [facility] ([facility])    [address] [place] [id number]   [phone]            Apr 19, 2018 11:00 AM CDT   LIS Extremity with Sujatha Wu PT   [facility] ([facility])    [facility] [address]   [phone]            Apr 26, 2018 11:00 AM CDT   LIS Extremity with Sujatha Wu PT   [facility] ([facility])    [facility] [address]   [phone]            Apr 30, 2018  6:30 PM CDT   (Arrive by 6:15 PM)   New Patient Visit with Vandana Santoyo PA-C   [facility] ([facility] [place])    [address] [facility] [id number] [phone]              Who to contact     If you have questions or need follow up information about today's clinic visit or your schedule please contact [facility] directly at [phone].  Normal or non-critical lab and imaging results will be communicated to you by MyChart, letter or phone within 4 business days after the clinic has received the results. If you do not hear  from us within 7 days, please contact the clinic through [employer] or phone. If you have a critical or abnormal lab result, we will notify you by phone as soon as possible.  Submit refill requests through [employer] or call your pharmacy and they will forward the refill request to us. Please allow 3 business days for your refill to be completed.          Additional Information About Your Visit        [employer] Information     [employer] gives you secure access to your electronic health record. If you see a primary care provider, you can also send messages to your care team and make appointments. If you have questions, please call your primary care clinic.  If you do not have a primary care provider, please call [phone] and they will assist you.        Care EveryWhere ID     This is your Care EveryWhere ID. This could be used by other organizations to access your [place] medical records  [id number]        Your Vitals Were     Last Period                   03/02/2018 (Approximate)            Blood Pressure from Last 3 Encounters:   03/16/18 130/85   02/13/18 121/73   02/06/18 115/80    Weight from Last 3 Encounters:   03/16/18 57.2 kg (126 lb)   02/13/18 60.2 kg (132 lb 12.8 oz)   02/06/18 61 kg (134 lb 6.4 oz)              We Performed the Following     HC PT EVAL, LOW COMPLEXITY     LIS INITIAL EVAL REPORT     THERAPEUTIC EXERCISES        Primary Care Provider Office Phone # Fax #    Bria Fox Kingsley [phone]       [address] [facility] [address]        Equal Access to Services     BRISA ESPINOZA : Hadii brie ku hadasho Soomaali, waaxda luqadaha, qaybta kaalmada adeegyada, waxay aurelia mancia . So [facility] [phone].    ATENCIÓN: Si habla español, tiene a mendoza disposición servicios gratuitos de asistencia lingüística. Llame al [phone].    We comply with applicable federal civil rights laws and [place] laws. We do not discriminate on the basis of race, color, national  origin, age, disability, sex, sexual orientation, or gender identity.            Thank you!     Thank you for choosing [facility]  for your care. Our goal is always to provide you with excellent care. Hearing back from our patients is one way we can continue to improve our services. Please take a few minutes to complete the written survey that you may receive in the mail after your visit with us. Thank you!             Your Updated Medication List - Protect others around you: Learn how to safely use, store and throw away your medicines at www.disposemymeds.org.          This list is accurate as of 3/29/18  2:09 PM.  Always use your most recent med list.                   Brand Name Dispense Instructions for use Diagnosis    doxycycline monohydrate 100 MG capsule     30 capsule    Take 1 capsule (100 mg) by mouth daily    Acne vulgaris, Dermatitis       EUCERIN CALMING DAILY MOIST Crea     396 g    Externally apply topically as needed    Dermatitis       polyethylene glycol powder    MIRALAX    510 g    Take 17 g (1 capful) by mouth daily    Chronic idiopathic constipation       psyllium 0.52 G capsule     100 capsule    Take 1 capsule (0.52 g) by mouth daily    Irritable bowel syndrome with both constipation and diarrhea

## 2018-03-29 NOTE — PROGRESS NOTES
[place] [facility] Initial Evaluation  Subjective:  Patient is a 23 year old female presenting with rehab right hip hpi.   Lissy Foote is a 23 year old female with a bilateral hips (bilateral legs/hip weakness) condition.  Condition occurred with:  Insidious onset.  Condition occurred: for unknown reasons.  This is a chronic condition  2/6/18 - MD order  Pt reports chronic history of bilat LE issues. Reports she had braces for bowlegged legs as a child. In middle school she had more pain in right knee and lower leg, was told it was growing pains. No longer feeling much pain. Will just have occasional twinges in right leg/ Bigger complaint now is more of balance problems. States she has difficulty  walking in a straight line and finds herself having to correct from veering right or left. No falls though.   Reports no dizzines. Does report low energy and wonders if she is eating enough..    Site of Pain: right knee and lower leg.  Radiates to: none.  Pain is described as aching and is intermittent Pain Scale: 1-2/10.  Associated with: decreased balance. Pain is the same all the time.  Symptoms are exacerbated by walking, other and ascending stairs (balance) Relieved by: nothing needed.  Since onset symptoms are gradually worsening.  Special testing: none of hips.  Previous treatment: nothing recent.  Improvement with previous treatment: NA.  General health as reported by patient is good.                                              Objective:  System    Ankle/Foot Evaluation  ROM:        Strength:    Dorsiflexion:  Left: 5-/5     Pain:   Right: 5-/5   Pain:  Plantarflexion: Left: 5-/5   Pain:   Right: 5-/5  Pain:                                                                       Hip Evaluation  Hip PROM:    Flexion: Left: WNL   Right: WNL        Internal Rotation: Left: 70    Right: 60  External Rotation: Left: 70    Right: 70              Hip Strength:  : poor quad control and endurance: fatigues with 8  reps of SLR flexion bilat, large cues to focus on TKE, easily demonstrates quad lag if not largely focusing on this.    Flexion:   Left: 4+/5   Pain:  Right: 4+/5   Pain:                    Extension:  Left: 4+/5  Pain:Right: 4+/5    Pain:    Abduction:  Right: 4-/5    Pain:        Knee Flexion:  Left: 5/5   Pain:Right: 5/5   Pain:  Knee Extension:  Left: 5/5   Pain:Right: 5/5    Pain:            Functional Testing:          Quad:    Single leg squat:    Left:    Mod jerry in balance control  Moderate loss of control, femoral IR and excessive anterior knee excursion  Right:  Mod jerry in balance control  Moderate loss of control, femoral IR and excessive anterior knee excursion    Bilateral leg squat:  Mild loss of control and excessive anterior knee excursion     Proprioception:    Stork balance test:   Left:    EO: 30 sec, EC: 6 sec  Right:  EO: 30 sec,  EC: 5 sec  % of Uninvolved:                General     ROS    Assessment/Plan:    Patient is a 23 year old female with bilateral lower extremity complaints.    Patient has the following significant findings with corresponding treatment plan.                Diagnosis 1:  Bilateral leg weakness and decreased balance    Pain -  self management, education and home program  Decreased strength - therapeutic exercise, therapeutic activities and home program  Impaired balance - neuro re-education, therapeutic activities and home program  Decreased proprioception - neuro re-education, therapeutic activities and home program  Impaired muscle performance - neuro re-education and home program  Decreased function - therapeutic activities and home program  Instability -  Therapeutic Activity  Therapeutic Exercise  Neuromuscular Re-education  home program    Therapy Evaluation Codes:   1) History comprised of:   Personal factors that impact the plan of care:      low weight.    Comorbidity factors that impact the plan of care are:      Weakness and anemia.     Medications impacting  care: None.  2) Examination of Body Systems comprised of:   Body structures and functions that impact the plan of care:      Hip and Knee.   Activity limitations that impact the plan of care are:      Squatting/kneeling, Stairs and Walking.  3) Clinical presentation characteristics are:   Stable/Uncomplicated.  4) Decision-Making    Low complexity using standardized patient assessment instrument and/or measureable assessment of functional outcome.  Cumulative Therapy Evaluation is: Low complexity.    Previous and current functional limitations:  (See Goal Flow Sheet for this information)    Short term and Long term goals: (See Goal Flow Sheet for this information)     Communication ability:  Patient appears to be able to clearly communicate and understand verbal and written communication and follow directions correctly.  Treatment Explanation - The following has been discussed with the patient:   RX ordered/plan of care  Anticipated outcomes  Possible risks and side effects  This patient would benefit from PT intervention to resume normal activities.   Rehab potential is good.    Frequency:  1 X week, once daily  Duration:  for 6 weeks tapering to 2 X a month over 4 weeks  Discharge Plan:  Achieve all LTG.  Independent in home treatment program.  Reach maximal therapeutic benefit.    Please refer to the daily flowsheet for treatment today, total treatment time and time spent performing 1:1 timed codes.

## 2018-03-30 NOTE — PROGRESS NOTES
[place] [facility] Initial Evaluation  Subjective:  Patient is a 23 year old female presenting with rehab left hip hpi.                                      Pertinent medical history includes:  Anemia and migraines.  Medical allergies: no.  Other surgeries include:  None reported.  Current medications:  Anti-inflammatory.  Current occupation is Student.    Primary job tasks include:  Prolonged sitting.        Red flags:  Calf pain, swelling, warmth and significant weakness.                        Objective:  System    Physical Exam    General     ROS    Assessment/Plan:

## 2018-04-05 ENCOUNTER — THERAPY VISIT (OUTPATIENT)
Dept: PHYSICAL THERAPY | Facility: CLINIC | Age: 23
End: 2018-04-05
Payer: COMMERCIAL

## 2018-04-05 DIAGNOSIS — M62.81 GENERALIZED MUSCLE WEAKNESS: ICD-10-CM

## 2018-04-05 PROCEDURE — 97112 NEUROMUSCULAR REEDUCATION: CPT | Mod: GP | Performed by: PHYSICAL THERAPIST

## 2018-04-05 PROCEDURE — 97110 THERAPEUTIC EXERCISES: CPT | Mod: GP | Performed by: PHYSICAL THERAPIST

## 2018-04-12 ENCOUNTER — THERAPY VISIT (OUTPATIENT)
Dept: PHYSICAL THERAPY | Facility: CLINIC | Age: 23
End: 2018-04-12
Payer: COMMERCIAL

## 2018-04-12 DIAGNOSIS — M62.81 GENERALIZED MUSCLE WEAKNESS: ICD-10-CM

## 2018-04-12 PROCEDURE — 97112 NEUROMUSCULAR REEDUCATION: CPT | Mod: GP | Performed by: PHYSICAL THERAPIST

## 2018-04-12 PROCEDURE — 97110 THERAPEUTIC EXERCISES: CPT | Mod: GP | Performed by: PHYSICAL THERAPIST

## 2018-04-19 ENCOUNTER — THERAPY VISIT (OUTPATIENT)
Dept: PHYSICAL THERAPY | Facility: CLINIC | Age: 23
End: 2018-04-19
Payer: COMMERCIAL

## 2018-04-19 DIAGNOSIS — M62.81 GENERALIZED MUSCLE WEAKNESS: ICD-10-CM

## 2018-04-19 PROCEDURE — 97110 THERAPEUTIC EXERCISES: CPT | Mod: GP | Performed by: PHYSICAL THERAPIST

## 2018-04-19 PROCEDURE — 97112 NEUROMUSCULAR REEDUCATION: CPT | Mod: GP | Performed by: PHYSICAL THERAPIST

## 2018-04-19 NOTE — MR AVS SNAPSHOT
After Visit Summary   4/19/2018    Lissy Foote    MRN: [id number]           Patient Information     Date Of Birth          1995        Visit Information        Provider Department      4/19/2018 11:00 AM Sujatha Wu PT [facility] [place]        Today's Diagnoses     Generalized muscle weakness           Follow-ups after your visit        Your next 10 appointments already scheduled     Apr 26, 2018 11:00 AM CDT   LIS Extremity with Sujatha Wu PT   [facility] [place] [facility]    [facility] [facility] [id number] [phone]            Apr 30, 2018  6:30 PM CDT   (Arrive by 6:15 PM)   New Patient Visit with Vandana Santoyo PA-C   [facility] ([facility] [place])    [address]  [address] [facility] [address] [phone]              Who to contact     If you have questions or need follow up information about today's clinic visit or your schedule please contact [facility] directly at [phone].  Normal or non-critical lab and imaging results will be communicated to you by Phantomhart, letter or phone within 4 business days after the clinic has received the results. If you do not hear from us within 7 days, please contact the clinic through Tecturat or phone. If you have a critical or abnormal lab result, we will notify you by phone as soon as possible.  Submit refill requests through Borqs or call your pharmacy and they will forward the refill request to us. Please allow 3 business days for your refill to be completed.          Additional Information About Your Visit        Phantomhart Information     Borqs gives you secure access to your electronic health record. If you see a primary care provider, you can also send messages to your care team and make appointments. If you have questions, please call your primary care clinic.  If  you do not have a primary care provider, please call [phone] and they will assist you.        Care EveryWhere ID     This is your Care EveryWhere ID. This could be used by other organizations to access your [place] medical records  [id number]         Blood Pressure from Last 3 Encounters:   03/16/18 130/85   02/13/18 121/73   02/06/18 115/80    Weight from Last 3 Encounters:   03/16/18 57.2 kg (126 lb)   02/13/18 60.2 kg (132 lb 12.8 oz)   02/06/18 61 kg (134 lb 6.4 oz)              We Performed the Following     NEUROMUSCULAR RE-EDUCATION     THERAPEUTIC EXERCISES        Primary Care Provider Office Phone # Fax #    Bria Kingsley [phone]       [address] [facility] [address]        Equal Access to Services     BRISA ESPINOZA : Hadii aad ku hadasho Sodrew, waaxda luqadaha, qaybta kaalmada karen, anne-marie mancia . So [facility] [phone].    ATENCIÓN: Si habla español, tiene a mendoza disposición servicios gratuitos de asistencia lingüística. Pan al [phone].    We comply with applicable federal civil rights laws and [place] laws. We do not discriminate on the basis of race, color, national origin, age, disability, sex, sexual orientation, or gender identity.            Thank you!     Thank you for choosing [facility] [place]  for your care. Our goal is always to provide you with excellent care. Hearing back from our patients is one way we can continue to improve our services. Please take a few minutes to complete the written survey that you may receive in the mail after your visit with us. Thank you!             Your Updated Medication List - Protect others around you: Learn how to safely use, store and throw away your medicines at www.disposemymeds.org.          This list is accurate as of 4/19/18 12:04 PM.  Always use your most recent med list.                   Brand Name Dispense Instructions for use Diagnosis     doxycycline monohydrate 100 MG capsule     30 capsule    Take 1 capsule (100 mg) by mouth daily    Acne vulgaris, Dermatitis       EUCERIN CALMING DAILY MOIST Crea     396 g    Externally apply topically as needed    Dermatitis       polyethylene glycol powder    MIRALAX    510 g    Take 17 g (1 capful) by mouth daily    Chronic idiopathic constipation       psyllium 0.52 g capsule     100 capsule    Take 1 capsule (0.52 g) by mouth daily    Irritable bowel syndrome with both constipation and diarrhea

## 2018-04-26 ENCOUNTER — TELEPHONE (OUTPATIENT)
Dept: DERMATOLOGY | Facility: CLINIC | Age: 23
End: 2018-04-26

## 2018-04-30 ENCOUNTER — OFFICE VISIT (OUTPATIENT)
Dept: DERMATOLOGY | Facility: CLINIC | Age: 23
End: 2018-04-30
Payer: COMMERCIAL

## 2018-04-30 DIAGNOSIS — L70.0 ACNE VULGARIS: Primary | ICD-10-CM

## 2018-04-30 RX ORDER — CLINDAMYCIN PHOSPHATE 10 MG/G
GEL TOPICAL 2 TIMES DAILY
Qty: 60 G | Refills: 11 | Status: SHIPPED | OUTPATIENT
Start: 2018-04-30 | End: 2018-06-29

## 2018-04-30 ASSESSMENT — PAIN SCALES - GENERAL: PAINLEVEL: NO PAIN (0)

## 2018-04-30 NOTE — PROGRESS NOTES
[facility] Dermatology Note    Dermatology Problem List:  1. Acne vulgaris  - s/p culture performed from chin 4/30/18  - clindamycin 1% gel, otc adapalene (Differin) 0.1% gel, Vanicream gentle cleanser/CeraVe lotion    CC:   Chief Complaint   Patient presents with     Derm Problem     Rash, Lissy states she has had a rash for two months only on her face.     Date of Service: Apr 30, 2018    History of Present Illness:  Ms. Lissy Foote is a 23 year old female who presents for an evaluation of a rash as a new patient. Today the patient reports that she has had a rash on her face for about 2 months.  The rash started under her chin and was not pruritic, but then it slowly spread around her face and became very pruritic. She has had some acne lesions on her face, but she felt that these lesions were a little different. She currently uses a Vanicream gentle cleanser and CeraVe lotion which helps with itching, but it gives her more acne lesions. She states that she did have some acne lesions on her chest and back. She also had some dandruff, but she started an otc anti dandruff shampoo which helped her scalp and the acne on her back. She denies any flaking on her eyebrows. She wonders if all of the spots on her face are do to fungus. She is requesting a culture. She notes that she did try a melasma cream around her mouth, but she used this many months before the rash appeared. Her menstrual cycle is regular. The patient reports no other lesions of concern at this time.    Otherwise, the patient reports no painful, bleeding, nonhealing, or pruritic lesions, and denies new or changing moles.    Past Medical History:   Patient Active Problem List   Diagnosis     Social anxiety disorder     Diarrhea     Anemia, iron deficiency     Generalized muscle weakness     No past medical history on file.  No past surgical history on file.     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    Family History:  No family history of skin conditions.    Medications:  Current Outpatient Prescriptions   Medication Sig Dispense Refill     polyethylene glycol (MIRALAX) powder Take 17 g (1 capful) by mouth daily 510 g 1     psyllium 0.52 G capsule Take 1 capsule (0.52 g) by mouth daily 100 capsule 3     doxycycline monohydrate 100 MG capsule Take 1 capsule (100 mg) by mouth daily (Patient not taking: Reported on 4/30/2018) 30 capsule 1     Emollient (EUCERIN CALMING DAILY MOIST) CREA Externally apply topically as needed (Patient not taking: Reported on 4/30/2018) 396 g 1     Allergies:  No Known Allergies     Review of Systems:  - Skin: As above in HPI. No additional skin concerns.  -Denies headaches, blurred vision, confusion, fever, chronic cough, arthralgias, shortness of breath, abdominal pain or hematochezia. Is feeling well otherwise, no other skin complaints.    Physical exam:  Vitals: There were no vitals taken for this visit.  GEN: This is a well developed, well-nourished female in no acute distress, in a pleasant mood.      SKIN: Sun-exposed skin, which includes the head/face, neck, both arms, digits, and/or nails was examined. She defers further examination.  - Closed comedones scattered on the forehead, lower face, and chin  - Few inflammatory papules to bilateral cheeks  - No other lesions of concern on areas examined.     Impression/Plan:  1. Acne vulgaris  - Discussion of acne and the potential causes.  - Continue Vanicream gentle cleanser and CeraVe lotion.  - Start clindamycin 1% gel - apply to face in the morning.  - Start otc adapalene (Differin) 0.1% gel - apply a pea size amount to a clean, dry face every other night. Discussion of sun sensitivity.  - Patient requested a culture. Culture performed today from chin.  - Photos obtained today.    Follow-up in 3 months, earlier for new or changing lesions.    Staff Involved:  Staff Only    Scribe Disclosure:   Kaila PURDY  Alexander, am serving as a scribe to document services personally performed by Vandana Santoyo PA-C, based on data collection and the provider's statements to me.    Provider Disclosure:   The documentation recorded by the scribe accurately reflects the services I personally performed and the decisions made by me.    All risks, benefits and alternatives were discussed with patient.  Patient is in agreement and understands the assessment and plan.  All questions were answered.  Sun Screen Education was given.   Return to Clinic in 3 months or sooner as needed.   Vandana Santoyo PA-C   [facility]

## 2018-04-30 NOTE — LETTER
4/30/2018       RE: Lissy Foote  [address]  [facility] [address]     Dear Colleague,    Thank you for referring your patient, Lissy Foote, to the [facility] at [facility]. Please see a copy of my visit note below.    [facility] Dermatology Note    Dermatology Problem List:  1. Acne vulgaris  - s/p culture performed from chin 4/30/18  - clindamycin 1% gel, otc adapalene (Differin) 0.1% gel, Vanicream gentle cleanser/CeraVe lotion    CC:   Chief Complaint   Patient presents with     Derm Problem     Rash, Lissy states she has had a rash for two months only on her face.     Date of Service: Apr 30, 2018    History of Present Illness:  Ms. Lissy Foote is a 23 year old female who presents for an evaluation of a rash as a new patient. Today the patient reports that she has had a rash on her face for about 2 months.  The rash started under her chin and was not pruritic, but then it slowly spread around her face and became very pruritic. She has had some acne lesions on her face, but she felt that these lesions were a little different. She currently uses a Vanicream gentle cleanser and CeraVe lotion which helps with itching, but it gives her more acne lesions. She states that she did have some acne lesions on her chest and back. She also had some dandruff, but she started an otc anti dandruff shampoo which helped her scalp and the acne on her back. She denies any flaking on her eyebrows. She wonders if all of the spots on her face are do to fungus. She is requesting a culture. She notes that she did try a melasma cream around her mouth, but she used this many months before the rash appeared. Her menstrual cycle is regular. The patient reports no other lesions of concern at this time.    Otherwise, the patient reports no painful, bleeding, nonhealing, or pruritic lesions, and denies new or changing moles.    Past Medical History:   Patient Active Problem List    Diagnosis     Social anxiety disorder     Diarrhea     Anemia, iron deficiency     Generalized muscle weakness     No past medical history on file.  No past surgical history on file.     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    Family History:  No family history of skin conditions.    Medications:  Current Outpatient Prescriptions   Medication Sig Dispense Refill     polyethylene glycol (MIRALAX) powder Take 17 g (1 capful) by mouth daily 510 g 1     psyllium 0.52 G capsule Take 1 capsule (0.52 g) by mouth daily 100 capsule 3     doxycycline monohydrate 100 MG capsule Take 1 capsule (100 mg) by mouth daily (Patient not taking: Reported on 4/30/2018) 30 capsule 1     Emollient (EUCERIN CALMING DAILY MOIST) CREA Externally apply topically as needed (Patient not taking: Reported on 4/30/2018) 396 g 1     Allergies:  No Known Allergies     Review of Systems:  - Skin: As above in HPI. No additional skin concerns.  -Denies headaches, blurred vision, confusion, fever, chronic cough, arthralgias, shortness of breath, abdominal pain or hematochezia. Is feeling well otherwise, no other skin complaints.    Physical exam:  Vitals: There were no vitals taken for this visit.  GEN: This is a well developed, well-nourished female in no acute distress, in a pleasant mood.      SKIN: Sun-exposed skin, which includes the head/face, neck, both arms, digits, and/or nails was examined. She defers further examination.  - Closed comedones scattered on the forehead, lower face, and chin  - Few inflammatory papules to bilateral cheeks  - No other lesions of concern on areas examined.     Impression/Plan:  1. Acne vulgaris  - Discussion of acne and the potential causes.  - Continue Vanicream gentle cleanser and CeraVe lotion.  - Start clindamycin 1% gel - apply to face in the morning.  - Start otc adapalene (Differin) 0.1% gel - apply a pea size amount to a clean, dry face every other  night. Discussion of sun sensitivity.  - Patient requested a culture. Culture performed today from chin.  - Photos obtained today.    Follow-up in 3 months, earlier for new or changing lesions.    Staff Involved:  Staff Only    Scribe Disclosure:   I, Kaila Munoz, am serving as a scribe to document services personally performed by Vandana Santoyo PA-C, based on data collection and the provider's statements to me.    Provider Disclosure:   The documentation recorded by the scribe accurately reflects the services I personally performed and the decisions made by me.    All risks, benefits and alternatives were discussed with patient.  Patient is in agreement and understands the assessment and plan.  All questions were answered.  Sun Screen Education was given.   Return to Clinic in 3 months or sooner as needed.   Vandana Santoyo PA-C   [facility]

## 2018-04-30 NOTE — MR AVS SNAPSHOT
After Visit Summary   4/30/2018    Lissy Foote    MRN: [id number]           Patient Information     Date Of Birth          1995        Visit Information        Provider Department      4/30/2018 6:30 PM Vandana Santoyo PA-C [facility]        Today's Diagnoses     Acne vulgaris    -  1      Care Instructions    Start Clindamycin 1% gel daily.   At bedtime alternate clindamycin 1% gel with adapalene (Differin) 0.1% gel. This is an over the counter mediation.    We will send your culture on my chart.           Follow-ups after your visit        Follow-up notes from your care team     Return in about 3 months (around 7/30/2018).      Your next 10 appointments already scheduled     Jul 23, 2018  2:15 PM CDT   (Arrive by 2:00 PM)   Return Visit with TRUNG Ponce [facility] ([facility])    [address] [place] [address] [phone]              Who to contact     Please call your clinic at [phone] to:    Ask questions about your health    Make or cancel appointments    Discuss your medicines    Learn about your test results    Speak to your doctor            Additional Information About Your Visit        [employer] Information     [employer] gives you secure access to your electronic health record. If you see a primary care provider, you can also send messages to your care team and make appointments. If you have questions, please call your primary care clinic.  If you do not have a primary care provider, please call [phone] and they will assist you.      [employer] is an electronic gateway that provides easy, online access to your medical records. With [employer], you can request a clinic appointment, read your test results, renew a prescription or communicate with your care team.     To access your existing account, please contact your [facility] or call [phone] for  assistance.        Care EveryWhere ID     This is your Care EveryWhere ID. This could be used by other organizations to access your [place] medical records  [id number]         Blood Pressure from Last 3 Encounters:   03/16/18 130/85   02/13/18 121/73   02/06/18 115/80    Weight from Last 3 Encounters:   03/16/18 57.2 kg (126 lb)   02/13/18 60.2 kg (132 lb 12.8 oz)   02/06/18 61 kg (134 lb 6.4 oz)              We Performed the Following     Skin Culture Aerobic Bacterial          Today's Medication Changes          These changes are accurate as of 4/30/18 11:59 PM.  If you have any questions, ask your nurse or doctor.               Start taking these medicines.        Dose/Directions    clindamycin 1 % topical gel   Commonly known as:  CLINDAMAX   Used for:  Acne vulgaris   Started by:  Vandana Santoyo PA-C        Apply topically 2 times daily   Quantity:  60 g   Refills:  11            Where to get your medicines      These medications were sent to [address] [address]     Phone:  [phone]     clindamycin 1 % topical gel                Primary Care Provider Office Phone # Fax #    Bria Kingsley [phone]       [address] [facility] [address]        Equal Access to Services     BRISA ESPINOZA AH: Hadii brie longo Sodrew, waaxda luqadaha, qaybta kaalmada karen, anne-marie lares. So [facility] [phone].    ATENCIÓN: Si habla español, tiene a mendoza disposición servicios gratuitos de asistencia lingüística. Llame al [phone].    We comply with applicable federal civil rights laws and [place] laws. We do not discriminate on the basis of race, color, national origin, age, disability, sex, sexual orientation, or gender identity.            Thank you!     Thank you for choosing [facility]  for your care. Our goal is always to provide you with excellent care.  Hearing back from our patients is one way we can continue to improve our services. Please take a few minutes to complete the written survey that you may receive in the mail after your visit with us. Thank you!             Your Updated Medication List - Protect others around you: Learn how to safely use, store and throw away your medicines at www.disposemymeds.org.          This list is accurate as of 4/30/18 11:59 PM.  Always use your most recent med list.                   Brand Name Dispense Instructions for use Diagnosis    clindamycin 1 % topical gel    CLINDAMAX    60 g    Apply topically 2 times daily    Acne vulgaris       doxycycline monohydrate 100 MG capsule     30 capsule    Take 1 capsule (100 mg) by mouth daily    Acne vulgaris, Dermatitis       EUCERIN CALMING DAILY MOIST Crea     396 g    Externally apply topically as needed    Dermatitis       polyethylene glycol powder    MIRALAX    510 g    Take 17 g (1 capful) by mouth daily    Chronic idiopathic constipation       psyllium 0.52 g capsule     100 capsule    Take 1 capsule (0.52 g) by mouth daily    Irritable bowel syndrome with both constipation and diarrhea

## 2018-04-30 NOTE — PATIENT INSTRUCTIONS
Start Clindamycin 1% gel daily.   At bedtime alternate clindamycin 1% gel with adapalene (Differin) 0.1% gel. This is an over the counter mediation.    We will send your culture on my chart.

## 2018-04-30 NOTE — NURSING NOTE
Dermatology Rooming Note    Lissy Foote's goals for this visit include:   Chief Complaint   Patient presents with     Derm Problem     Rash, Lissy states she has had a rash for two months only on her face.     Abi Moctezuma LPN

## 2018-05-02 LAB
BACTERIA SPEC CULT: ABNORMAL
SPECIMEN SOURCE: ABNORMAL

## 2018-05-28 ENCOUNTER — HEALTH MAINTENANCE LETTER (OUTPATIENT)
Age: 23
End: 2018-05-28

## 2018-06-29 ENCOUNTER — OFFICE VISIT (OUTPATIENT)
Dept: FAMILY MEDICINE | Facility: CLINIC | Age: 23
End: 2018-06-29

## 2018-06-29 VITALS
SYSTOLIC BLOOD PRESSURE: 104 MMHG | DIASTOLIC BLOOD PRESSURE: 72 MMHG | RESPIRATION RATE: 18 BRPM | BODY MASS INDEX: 20.01 KG/M2 | WEIGHT: 131.6 LBS | HEART RATE: 114 BPM | OXYGEN SATURATION: 100 %

## 2018-06-29 DIAGNOSIS — L70.0 ACNE VULGARIS: Primary | ICD-10-CM

## 2018-06-29 PROBLEM — M62.81 GENERALIZED MUSCLE WEAKNESS: Status: RESOLVED | Noted: 2018-03-29 | Resolved: 2018-06-29

## 2018-06-29 RX ORDER — CLINDAMYCIN PHOSPHATE 10 MG/G
GEL TOPICAL DAILY
Qty: 60 G | Refills: 1 | Status: SHIPPED | OUTPATIENT
Start: 2018-06-29 | End: 2018-11-07

## 2018-06-29 RX ORDER — ADAPALENE 0.1 G/100G
CREAM TOPICAL AT BEDTIME
Qty: 45 G | Refills: 0 | Status: SHIPPED | OUTPATIENT
Start: 2018-06-29 | End: 2018-11-07

## 2018-06-29 ASSESSMENT — ENCOUNTER SYMPTOMS
COUGH: 0
SHORTNESS OF BREATH: 0
FEVER: 0
CHILLS: 0
EYES NEGATIVE: 1

## 2018-06-29 NOTE — PATIENT INSTRUCTIONS
Here is the plan from today's visit    1. Acne vulgaris  - Use vanicream cleanser and cerave    - adapalene (DIFFERIN) 0.1 % cream; Apply topically At Bedtime  Dispense: 45 g; Refill: 0  - clindamycin (CLINDAMAX) 1 % topical gel; Apply topically daily  Dispense: 60 g; Refill: 1  - Look at accutane     Please call or return to clinic if your symptoms don't go away.    Follow up plan  Follow up with dermatology within one month.     Thank you for coming to [facility] today.  Lab Testing:  **If you had lab testing today and your results are reassuring or normal they will be mailed to you or sent through [employer] within 7 days.   **If the lab tests need quick action we will call you with the results.  The phone number we will call with results is # [phone] (home) . If this is not the best number please call our clinic and change the number.  Medication Refills:  If you need any refills please call your pharmacy and they will contact us.   If you need to  your refill at a new pharmacy, please contact the new pharmacy directly. The new pharmacy will help you get your medications transferred faster.   Scheduling:  If you have any concerns about today's visit or wish to schedule another appointment please call our office during normal business hours [phone] (8-5:00 M-F)  If a referral was made to a [facility] and you don't get a call from central scheduling please call [phone].  If a Mammogram was ordered for you at [facility] call [phone] to schedule or change your appointment.  If you had an XRay/CT/Ultrasound/MRI ordered the number is [phone] to schedule or change your radiology appointment.   Medical Concerns:  If you have urgent medical concerns please call [phone] at any time of the day.    Tesha [facility] MD

## 2018-06-29 NOTE — PROGRESS NOTES
HPI:       Lissy Foote is a 23 year old who presents for the following  Patient presents with:  Infection: possible staph infection on the face    Acne: She presents with concerns of staph infection since she had wound culture that grew staph aureus and has been using clindamycin gel that helped. She stopped the clindamycin gel 2-3 weeks ago since her acne improved and she used it for one month. Feels tingling on her face like she has a fever when she starts getting pimples. Sent to dermatologist 4/30/18 with wound culture with staph and she started clindamycin gel but did not start Differin gel. She has boils that have come back and picks at them.     Worse on bilateral chins. Has used neosporin once one week ago that helped. She is not using vanicream cleanser or cerave lotion since she thought using tap water was better.  States that cerave lotion helped with itching. Not currently itchy but has been in the past. Starts off as a bump and then becomes a pimple. States acne is better than previously though.     Problem, Medication and Allergy Lists were reviewed and are current.  Patient is an established patient of this clinic., History reviewed. No pertinent past medical history.,   Family History     Problem (# of Occurrences) Relation (Name,Age of Onset)    Thyroid Disease (2) Mother, Maternal Grandmother       and   Social History     Social History     Marital status: Single     Spouse name: N/A     Number of children: N/A     Years of education: N/A     Social History Main Topics     Smoking status: Never Smoker     Smokeless tobacco: Never Used     Alcohol use No     Drug use: No     Sexual activity: No            Review of Systems:   Review of Systems   Constitutional: Negative for chills and fever.   HENT: Negative.    Eyes: Negative.    Respiratory: Negative for cough and shortness of breath.    Cardiovascular: Negative for chest pain.   Skin: Positive for rash (face and ears).             Physical  Exam:   Patient Vitals for the past 24 hrs:   BP Pulse Resp SpO2 Weight   06/29/18 1433 104/72 114 18 100 % 131 lb 9.6 oz (59.7 kg)     Body mass index is 20.01 kg/(m^2).  Vitals were reviewed and were normal except slightly elevated HR     Physical Exam   Constitutional: She appears well-developed and well-nourished. No distress.   HENT:   Head: Normocephalic and atraumatic.   Eyes: Conjunctivae are normal. No scleral icterus.   Cardiovascular: Normal rate, regular rhythm and normal heart sounds.    No murmur heard.  Pulmonary/Chest: Effort normal and breath sounds normal. She has no wheezes.   Skin: Skin is warm and dry. Rash (present on face and ears, worse on chin that is consistent with acne) noted. Rash is maculopapular and pustular.   Psychiatric: She has a normal mood and affect. Her behavior is normal.         Results:      Results from the last 24 hoursNo results found for this or any previous visit (from the past 24 hour(s)).  Assessment and Plan     Lissy was seen today for infection.    Diagnoses and all orders for this visit:    Acne vulgaris  Recommended that patient restart clindamycin gel daily and start adapalene cream that was prescribed by the dermatologist a few months ago that she had never started. Recommended that patient follow up with Dermatology to discuss further management. Continue to use vanicream for cleanser and cerave for lotion.   -     adapalene (DIFFERIN) 0.1 % cream; Apply topically At Bedtime  -     clindamycin (CLINDAMAX) 1 % topical gel; Apply topically daily    Medications Discontinued During This Encounter   Medication Reason     clindamycin (CLINDAMAX) 1 % topical gel Reorder     Options for treatment and follow-up care were reviewed with the patient. Lissy Foote  engaged in the decision making process and verbalized understanding of the options discussed and agreed with the final plan.    T[facility]ha [facility],

## 2018-06-29 NOTE — PROGRESS NOTES
Preceptor Attestation:   Patient seen and discussed with the resident. Assessment and plan reviewed with resident and agreed upon.   Supervising Physician:  [facility]

## 2018-06-29 NOTE — MR AVS SNAPSHOT
After Visit Summary   6/29/2018    Lissy Foote    MRN: [id number]           Patient Information     Date Of Birth          1995        Visit Information        Provider Department      6/29/2018 2:20 PM Tesha [facility] MD [facility]        Today's Diagnoses     Acne vulgaris    -  1      Care Instructions    Here is the plan from today's visit    1. Acne vulgaris  - Use vanicream cleanser and cerave    - adapalene (DIFFERIN) 0.1 % cream; Apply topically At Bedtime  Dispense: 45 g; Refill: 0  - clindamycin (CLINDAMAX) 1 % topical gel; Apply topically daily  Dispense: 60 g; Refill: 1  - Look at accutane     Please call or return to clinic if your symptoms don't go away.    Follow up plan  Follow up with dermatology within one month.     Thank you for coming to [facility] today.  Lab Testing:  **If you had lab testing today and your results are reassuring or normal they will be mailed to you or sent through [employer] within 7 days.   **If the lab tests need quick action we will call you with the results.  The phone number we will call with results is # [phone] (home) . If this is not the best number please call our clinic and change the number.  Medication Refills:  If you need any refills please call your pharmacy and they will contact us.   If you need to  your refill at a new pharmacy, please contact the new pharmacy directly. The new pharmacy will help you get your medications transferred faster.   Scheduling:  If you have any concerns about today's visit or wish to schedule another appointment please call our office during normal business hours [phone] (8-5:00 M-F)  If a referral was made to a [facility] and you don't get a call from central scheduling please call [phone].  If a Mammogram was ordered for you at [facility] call [phone] to schedule or change your appointment.  If you had an XRay/CT/Ultrasound/MRI  ordered the number is [phone] to schedule or change your radiology appointment.   Medical Concerns:  If you have urgent medical concerns please call [phone] at any time of the day.    T[facility] MD            Follow-ups after your visit        Your next 10 appointments already scheduled     Jul 23, 2018  2:15 PM CDT   (Arrive by 2:00 PM)   Return Visit with TRUNG Ponce [facility] ([facility])    [address] [place] [address] [phone]              Who to contact     Please call your clinic at [phone] to:    Ask questions about your health    Make or cancel appointments    Discuss your medicines    Learn about your test results    Speak to your doctor            Additional Information About Your Visit        [employer] Information     [employer] gives you secure access to your electronic health record. If you see a primary care provider, you can also send messages to your care team and make appointments. If you have questions, please call your primary care clinic.  If you do not have a primary care provider, please call [phone] and they will assist you.      [employer] is an electronic gateway that provides easy, online access to your medical records. With [employer], you can request a clinic appointment, read your test results, renew a prescription or communicate with your care team.     To access your existing account, please contact your [facility] or call [phone] for assistance.        Care EveryWhere ID     This is your Care EveryWhere ID. This could be used by other organizations to access your [place] medical records  [id number]        Your Vitals Were     Pulse Respirations Last Period Pulse Oximetry BMI (Body Mass Index)       114 18 06/08/2018 100% 20.01 kg/m2        Blood Pressure from Last 3 Encounters:   06/29/18 104/72   03/16/18 130/85   02/13/18 121/73    Weight  from Last 3 Encounters:   06/29/18 131 lb 9.6 oz (59.7 kg)   03/16/18 126 lb (57.2 kg)   02/13/18 132 lb 12.8 oz (60.2 kg)              Today, you had the following     No orders found for display         Today's Medication Changes          These changes are accurate as of 6/29/18  3:03 PM.  If you have any questions, ask your nurse or doctor.               Start taking these medicines.        Dose/Directions    adapalene 0.1 % cream   Commonly known as:  DIFFERIN   Used for:  Acne vulgaris   Started by:  Tesha [facility] MD        Apply topically At Bedtime   Quantity:  45 g   Refills:  0         These medicines have changed or have updated prescriptions.        Dose/Directions    clindamycin 1 % topical gel   Commonly known as:  CLINDAMAX   This may have changed:  when to take this   Used for:  Acne vulgaris   Changed by:  Tesha [facility] MD        Apply topically daily   Quantity:  60 g   Refills:  1            Where to get your medicines      These medications were sent to [facility] [place], [place] - [address]   [address] [facility] [address]     Phone:  [phone]     adapalene 0.1 % cream    clindamycin 1 % topical gel                Primary Care Provider Office Phone # Fax #    Bria Kingsley [phone]       [address] [facility] [address]        Equal Access to Services     ELIE ESPINOZA AH: Hadii brie villanueva hadmaximiliano Sodrew, waaxda luqadaha, qaybta kaalmada adeankuryada, anne-marie lares. So [facility] [phone].    ATENCIÓN: Si habla español, tiene a mendoza disposición servicios gratuitos de asistencia lingüística. Llame al [phone].    We comply with applicable federal civil rights laws and [place] laws. We do not discriminate on the basis of race, color, national origin, age, disability, sex, sexual orientation, or gender identity.            Thank you!     Thank you for choosing [facility] [place]  for your  care. Our goal is always to provide you with excellent care. Hearing back from our patients is one way we can continue to improve our services. Please take a few minutes to complete the written survey that you may receive in the mail after your visit with us. Thank you!             Your Updated Medication List - Protect others around you: Learn how to safely use, store and throw away your medicines at www.disposemymeds.org.          This list is accurate as of 6/29/18  3:03 PM.  Always use your most recent med list.                   Brand Name Dispense Instructions for use Diagnosis    adapalene 0.1 % cream    DIFFERIN    45 g    Apply topically At Bedtime    Acne vulgaris       clindamycin 1 % topical gel    CLINDAMAX    60 g    Apply topically daily    Acne vulgaris       EUCERIN CALMING DAILY MOIST Crea     396 g    Externally apply topically as needed    Dermatitis       polyethylene glycol powder    MIRALAX    510 g    Take 17 g (1 capful) by mouth daily    Chronic idiopathic constipation       psyllium 0.52 g capsule     100 capsule    Take 1 capsule (0.52 g) by mouth daily    Irritable bowel syndrome with both constipation and diarrhea

## 2018-08-30 ENCOUNTER — OFFICE VISIT (OUTPATIENT)
Dept: FAMILY MEDICINE | Facility: CLINIC | Age: 23
End: 2018-08-30

## 2018-08-30 VITALS
BODY MASS INDEX: 20.22 KG/M2 | TEMPERATURE: 98 F | HEART RATE: 84 BPM | DIASTOLIC BLOOD PRESSURE: 80 MMHG | WEIGHT: 133 LBS | OXYGEN SATURATION: 100 % | SYSTOLIC BLOOD PRESSURE: 117 MMHG | RESPIRATION RATE: 20 BRPM

## 2018-08-30 DIAGNOSIS — R30.0 DYSURIA: Primary | ICD-10-CM

## 2018-08-30 DIAGNOSIS — R35.0 URINARY FREQUENCY: ICD-10-CM

## 2018-08-30 RX ORDER — SULFAMETHOXAZOLE/TRIMETHOPRIM 800-160 MG
1 TABLET ORAL 2 TIMES DAILY
Qty: 6 TABLET | Refills: 0 | Status: SHIPPED | OUTPATIENT
Start: 2018-08-30 | End: 2018-09-02

## 2018-08-30 NOTE — PROGRESS NOTES
"       HPI       Lissy Foote is a 23 year old  who presents for   Chief Complaint   Patient presents with     Dysuria     burning, increased use, unable to empty      Rupal presents with urinary frequency. Onset 2-3 weeks ago. Has the sensation that she needs to urinate \"all the time.\" Denies incomplete bladder emptying. Will also experience discomfort with urination. Denies hematuria, vaginal discharge or itching. Denies abdominal pain. Felt warmer than usual a few days ago but was also experiencing URI symptoms at that time. Has never had a UTI in the past. Reports she practices appopriate feminine hygiene such as wiping from \"front to back.\" Denies constipation.     LMP: currently menstruating.   Is not sexually active and has never been.     She also denies fatigue, polyuria, polydipsia, polyphagia. There is a history of diabetes (she thinks is type 2 DM) in maternal uncle. No other family members with DM. She denies changes in weight.     +++++++    Problem, Medication and Allergy Lists were reviewed and updated if needed..    Patient is an established patient of this clinic..         Review of Systems:   Review of Systems   Constitutional: Negative for appetite change, chills, diaphoresis and fever.   Gastrointestinal: Negative for abdominal pain, constipation, diarrhea, nausea and vomiting.   Genitourinary: Positive for dysuria and frequency. Negative for decreased urine volume, difficulty urinating, flank pain, hematuria, menstrual problem, urgency and vaginal discharge.   Neurological: Negative for headaches.            Physical Exam:     Vitals:    08/30/18 1639   BP: 117/80   Pulse: 84   Resp: 20   Temp: 98  F (36.7  C)   TempSrc: Oral   SpO2: 100%   Weight: 133 lb (60.3 kg)     Body mass index is 20.22 kg/(m^2).  Vitals were reviewed and were normal     Physical Exam   Constitutional: She is oriented to person, place, and time. No distress.   HENT:   Head: Normocephalic and atraumatic.   Pulmonary/Chest: " Effort normal.   Abdominal: Soft. She exhibits no distension and no mass. There is no tenderness. There is no guarding.   Musculoskeletal: She exhibits no edema.   Neurological: She is alert and oriented to person, place, and time.   Skin: She is not diaphoretic.   Psychiatric: She has a normal mood and affect. Her behavior is normal.         Results:   Patient did urinate for a urine sample however she spilled the sample, therefore a UA/UC was not completed.     Assessment and Plan        1. Dysuria  2. Urinary Frequency  Per symptoms, most likely a UTI. Low suspicion for pyelonephritis or other vaginal infection. Will treat per symptoms with 3 day course of bactrim. If symptoms do not resolve after 3 days, RTC for further evaluation.   Also discussed symptoms of DM in setting of urinary frequency however this is unlikely as patient denies fatigue, polyuria, polydipsia, polyphagia, change in weight, and there is no strong family hx of DM.   - sulfamethoxazole-trimethoprim (BACTRIM DS/SEPTRA DS) 800-160 MG per tablet; Take 1 tablet by mouth 2 times daily for 3 days  Dispense: 6 tablet; Refill: 0       There are no discontinued medications.    Options for treatment and follow-up care were reviewed with the patient. Lissy Foote  engaged in the decision making process and verbalized understanding of the options discussed and agreed with the final plan.    Sirisha Ledbetter MD  Family Medicine PGY3 Resident

## 2018-08-30 NOTE — MR AVS SNAPSHOT
After Visit Summary   8/30/2018    Lissy Foote    MRN: [id number]           Patient Information     Date Of Birth          1995        Visit Information        Provider Department      8/30/2018 4:20 PM Ryan Ledbetter MD [facility]        Today's Diagnoses     Dysuria    -  1    Urinary frequency           Follow-ups after your visit        Who to contact     Please call your clinic at [phone] to:    Ask questions about your health    Make or cancel appointments    Discuss your medicines    Learn about your test results    Speak to your doctor            Additional Information About Your Visit        [employer] Information     [facility] gives you secure access to your electronic health record. If you see a primary care provider, you can also send messages to your care team and make appointments. If you have questions, please call your primary care clinic.  If you do not have a primary care provider, please call [phone] and they will assist you.      [facility] is an electronic gateway that provides easy, online access to your medical records. With [facility], you can request a clinic appointment, read your test results, renew a prescription or communicate with your care team.     To access your existing account, please contact your [facility] or call [phone] for assistance.        Care EveryWhere ID     This is your Care EveryWhere ID. This could be used by other organizations to access your [place] medical records  [id number]        Your Vitals Were     Pulse Temperature Respirations Last Period Pulse Oximetry BMI (Body Mass Index)    84 98  F (36.7  C) (Oral) 20 08/24/2018 (Approximate) 100% 20.22 kg/m2       Blood Pressure from Last 3 Encounters:   08/30/18 117/80   06/29/18 104/72   03/16/18 130/85    Weight from Last 3 Encounters:   08/30/18 133 lb (60.3 kg)   06/29/18 131 lb 9.6 oz (59.7 kg)   03/16/18 126 lb (57.2 kg)               Today, you had the following     No orders found for display         Today's Medication Changes          These changes are accurate as of 8/30/18 11:59 PM.  If you have any questions, ask your nurse or doctor.               Start taking these medicines.        Dose/Directions    sulfamethoxazole-trimethoprim 800-160 MG per tablet   Commonly known as:  BACTRIM DS/SEPTRA DS   Used for:  Dysuria   Started by:  Ryan Ledbetter MD        Dose:  1 tablet   Take 1 tablet by mouth 2 times daily for 3 days   Quantity:  6 tablet   Refills:  0            Where to get your medicines      These medications were sent to [facility] [place], [place] - [address]   [address] [facility] [address]     Phone:  [phone]     sulfamethoxazole-trimethoprim 800-160 MG per tablet                Primary Care Provider Office Phone # Fax #    Bria Fox Kingsley [phone]       [address] [facility] [address]        Equal Access to Services     BRISA ESPINOZA : Hadii brie longo Sodrew, waaxda luqadaha, qaybta kaalmada adeankuryada, anne-marie mancia . So [facility] [phone].    ATENCIÓN: Si habla español, tiene a mendoza disposición servicios gratuitos de asistencia lingüística. Pan al [phone].    We comply with applicable federal civil rights laws and [place] laws. We do not discriminate on the basis of race, color, national origin, age, disability, sex, sexual orientation, or gender identity.            Thank you!     Thank you for choosing [facility]  for your care. Our goal is always to provide you with excellent care. Hearing back from our patients is one way we can continue to improve our services. Please take a few minutes to complete the written survey that you may receive in the mail after your visit with us. Thank you!             Your Updated Medication List - Protect others around you: Learn how to safely use, store and throw away  your medicines at www.disposemymeds.org.          This list is accurate as of 8/30/18 11:59 PM.  Always use your most recent med list.                   Brand Name Dispense Instructions for use Diagnosis    adapalene 0.1 % cream    DIFFERIN    45 g    Apply topically At Bedtime    Acne vulgaris       clindamycin 1 % topical gel    CLINDAMAX    60 g    Apply topically daily    Acne vulgaris       EUCERIN CALMING DAILY MOIST Crea     396 g    Externally apply topically as needed    Dermatitis       polyethylene glycol powder    MIRALAX    510 g    Take 17 g (1 capful) by mouth daily    Chronic idiopathic constipation       psyllium 0.52 g capsule     100 capsule    Take 1 capsule (0.52 g) by mouth daily    Irritable bowel syndrome with both constipation and diarrhea       sulfamethoxazole-trimethoprim 800-160 MG per tablet    BACTRIM DS/SEPTRA DS    6 tablet    Take 1 tablet by mouth 2 times daily for 3 days    Dysuria

## 2018-08-31 ENCOUNTER — TELEPHONE (OUTPATIENT)
Dept: FAMILY MEDICINE | Facility: CLINIC | Age: 23
End: 2018-08-31

## 2018-08-31 NOTE — TELEPHONE ENCOUNTER
Per chart review, patient not seen in ER in our system. May have gone outside of the system for evaluation. RN attempted to follow up and relay information below.     Unable to reach. Left VM with name and call back number.    Selina Yadav RN

## 2018-08-31 NOTE — TELEPHONE ENCOUNTER
Pt transferred to RN.     Pt seen in clinic yesterday 8/30 by Dr. Ledbetter for UTI. Prescribed Bactrim DS BID for three days. Pt states she took her first pill at 10pm. Pt woke up at 4am with chest pain and trouble breathing. Pt states the chest pain is in between being a sharp pain and a tightness, hurts when she moves and breaths. Pt states she feels short of breath and its difficult to breath. Pt denies N/V or headache. Pt does state she feels lightheaded. Pain and breathing have stated the same    RN recommended pt not take another bactim and have someone bring her to the ED right away to be evaluated. Pt verbalized understanding and stated someone could bring her    Message routed to Dr Ledbetter to update allergies if appropriate    Maribell Sheehan RN

## 2018-08-31 NOTE — TELEPHONE ENCOUNTER
I also recommend that patient be evaluated in ED or same day add on in clinic. Should be evaluated for SOB and chest pain. Ok to hold bactrim until evaluation. Return to clinic for alternative medication if believed to be bactrim allergy following evaluation. At that time, will place bactrim on allergy list.      Sirisha Ledbetter MD  Family Medicine PGY3 Resident

## 2018-09-01 ASSESSMENT — ENCOUNTER SYMPTOMS
HEMATURIA: 0
DIAPHORESIS: 0
ABDOMINAL PAIN: 0
FLANK PAIN: 0
FEVER: 0
DIFFICULTY URINATING: 0
NAUSEA: 0
DYSURIA: 1
DIARRHEA: 0
HEADACHES: 0
VOMITING: 0
APPETITE CHANGE: 0
FREQUENCY: 1
CONSTIPATION: 0
CHILLS: 0

## 2018-09-04 NOTE — TELEPHONE ENCOUNTER
RN called pt who stated she went to [facility] ED and they thought it was heartburn related. Pt stated she finished bactim with no issues    Message routed to DR. Khloe Sheehan RN

## 2018-09-05 NOTE — PROGRESS NOTES
Preceptor Attestation:   Patient seen, evaluated and discussed with the resident. I have verified the content of the note, which accurately reflects my assessment of the patient and the plan of care.   Supervising Physician:  Bree Jackson MD

## 2018-10-30 ENCOUNTER — OFFICE VISIT (OUTPATIENT)
Dept: FAMILY MEDICINE | Facility: CLINIC | Age: 23
End: 2018-10-30

## 2018-10-30 VITALS
BODY MASS INDEX: 19.16 KG/M2 | OXYGEN SATURATION: 100 % | DIASTOLIC BLOOD PRESSURE: 79 MMHG | TEMPERATURE: 97.9 F | HEART RATE: 75 BPM | WEIGHT: 126 LBS | SYSTOLIC BLOOD PRESSURE: 114 MMHG

## 2018-10-30 DIAGNOSIS — D50.9 IRON DEFICIENCY ANEMIA, UNSPECIFIED IRON DEFICIENCY ANEMIA TYPE: Primary | ICD-10-CM

## 2018-10-30 DIAGNOSIS — R53.83 OTHER FATIGUE: ICD-10-CM

## 2018-10-30 DIAGNOSIS — L65.9 HAIR LOSS: ICD-10-CM

## 2018-10-30 LAB
BASOPHILS # BLD AUTO: 0 10E9/L (ref 0–0.2)
BASOPHILS NFR BLD AUTO: 1 %
DIFFERENTIAL METHOD BLD: ABNORMAL
EOSINOPHIL # BLD AUTO: 0.1 10E9/L (ref 0–0.7)
EOSINOPHIL NFR BLD AUTO: 1.3 %
ERYTHROCYTE [DISTWIDTH] IN BLOOD BY AUTOMATED COUNT: 17.8 % (ref 10–15)
FERRITIN SERPL-MCNC: 7 NG/ML (ref 12–150)
HCT VFR BLD AUTO: 41.6 % (ref 35–47)
HGB BLD-MCNC: 12.3 G/DL (ref 11.7–15.7)
IMM GRANULOCYTES # BLD: 0 10E9/L (ref 0–0.4)
IMM GRANULOCYTES NFR BLD: 0 %
IRON SATN MFR SERPL: 93 % (ref 15–46)
IRON SERPL-MCNC: 420 UG/DL (ref 35–180)
LYMPHOCYTES # BLD AUTO: 1.6 10E9/L (ref 0.8–5.3)
LYMPHOCYTES NFR BLD AUTO: 41.9 %
MCH RBC QN AUTO: 24.2 PG (ref 26.5–33)
MCHC RBC AUTO-ENTMCNC: 29.6 G/DL (ref 31.5–36.5)
MCV RBC AUTO: 82 FL (ref 78–100)
MONOCYTES # BLD AUTO: 0.5 10E9/L (ref 0–1.3)
MONOCYTES NFR BLD AUTO: 11.7 %
NEUTROPHILS # BLD AUTO: 1.7 10E9/L (ref 1.6–8.3)
NEUTROPHILS NFR BLD AUTO: 44.1 %
NRBC # BLD AUTO: 0 10*3/UL
NRBC BLD AUTO-RTO: 0 /100
PLATELET # BLD AUTO: 203 10E9/L (ref 150–450)
PLATELET # BLD EST: ABNORMAL 10*3/UL
RBC # BLD AUTO: 5.08 10E12/L (ref 3.8–5.2)
TIBC SERPL-MCNC: 449 UG/DL (ref 240–430)
TRANSFERRIN SERPL-MCNC: 316 MG/DL (ref 210–360)
TSH SERPL DL<=0.005 MIU/L-ACNC: 1.7 MU/L (ref 0.4–4)
WBC # BLD AUTO: 3.8 10E9/L (ref 4–11)

## 2018-10-30 ASSESSMENT — ENCOUNTER SYMPTOMS
FATIGUE: 1
CONSTIPATION: 1
MYALGIAS: 1
UNEXPECTED WEIGHT CHANGE: 0
VOMITING: 0
HEADACHES: 1
LIGHT-HEADEDNESS: 0
NAUSEA: 0
POLYPHAGIA: 0
CHILLS: 0
ABDOMINAL PAIN: 1
POLYDIPSIA: 0
DIZZINESS: 0
DIARRHEA: 0
SHORTNESS OF BREATH: 0
FEVER: 0
PALPITATIONS: 0

## 2018-10-30 NOTE — PROGRESS NOTES
Preceptor Attestation:   Patient seen, evaluated and discussed with the resident. I have verified the content of the note, which accurately reflects my assessment of the patient and the plan of care.   Supervising Physician:  Ernestine Gorman MD

## 2018-10-30 NOTE — PROGRESS NOTES
STEVE Foote is a 23 year old  who presents for   Chief Complaint   Patient presents with     RECHECK     Iron deficiency-having hair loss and leg pain     requesting labs     would like vitamin d and allergy test for milk and wheat     1)iron deficiency anemia: She reports that 2 years ago she had testing done and was found to have low iron.  She was prescribed an iron supplement but only took it for 2 weeks and then stopped.  At that time she was having headaches associated with the anemia.  Over the past 1 year she has noticed that her headaches have returned and so is concerned she is anemic.  She is also noticed thinning of her hair, mostly on the sides of her head.  She has had fatigue but is unsure if this is related to studying for exams for school.    2) milk effects on acne: Patient read online that if you take milk in a year diet will improve your acne.  She has removed milk from her diet and says she has noticed an improvement.  She is wondering if she needs to be tested for a milk allergy.  Is a substitute for dairy milk she has been using soy milk.     3) Worried she may have celiac disease: She says when she eats traditional [place] food, which is rich in carbohydrates and wheat products, she will experience abdominal discomfort and constipation.  No diarrhea, no bloating, no abdominal distention, no nausea, no vomiting.  Is wondering if she needs to be tested again for celiac disease.  She was tested in 2016 and workup was negative.    Problem, Medication and Allergy Lists were reviewed and updated if needed..    Patient is an established patient of this clinic..         Review of Systems:   Review of Systems   Constitutional: Positive for fatigue. Negative for chills, fever and unexpected weight change.   Eyes: Negative for visual disturbance.   Respiratory: Negative for shortness of breath.    Cardiovascular: Negative for chest pain and palpitations.   Gastrointestinal: Positive for  abdominal pain and constipation. Negative for diarrhea, nausea and vomiting.   Endocrine: Negative for polydipsia, polyphagia and polyuria.   Musculoskeletal: Positive for myalgias (leg pain).   Skin: Negative for pallor.   Neurological: Positive for headaches. Negative for dizziness and light-headedness.            Physical Exam:     Vitals:    10/30/18 1359   BP: 114/79   Pulse: 75   Temp: 97.9  F (36.6  C)   TempSrc: Oral   SpO2: 100%   Weight: 126 lb (57.2 kg)     Body mass index is 19.16 kg/(m^2).  Vitals were reviewed and were normal     Physical Exam   Constitutional: She is oriented to person, place, and time. No distress.   HENT:   Head: Normocephalic and atraumatic.   Eyes: Conjunctivae are normal.   Cardiovascular: Normal rate, regular rhythm and normal heart sounds.    Pulmonary/Chest: Effort normal and breath sounds normal.   Neurological: She is alert and oriented to person, place, and time.   Skin: She is not diaphoretic. No pallor.   No abnormal nail changes   Psychiatric: She has a normal mood and affect. Her behavior is normal.         Results:   Results are ordered and pending    Assessment and Plan        1. Iron deficiency anemia, unspecified iron deficiency anemia type  Patient had anemia studies completed in 2016 which were remarkable for a ferritin: 3, Hb 10.7, MCV 77 consistent with a microcytic anemia likely due to iron deficiency anemia.  Will recheck iron studies today and prescribe supplement per results.  Discussed the side effects of iron replacement such as constipation and discoloration of stools.  - IRON AND IRON BINDING CAPACITY  - Ferritin  - Transferrin  - CBC with platelets differential    2. Hair loss  3. Other fatigue  In addition to anemia testing as above, will check thyroid function in setting of hair loss and fatigue.  - TSH with free T4 reflex    In regards to concern for celiac disease, per chart review, patient has been tested in the past and workup has been negative.   It is likely that she is becoming constipated due to the foods she is eating and does not have a gluten sensitivity.  Discussed avoidance/decreasing foods that are high risk for constipation, drinking plenty of fluids.  She has MiraLAX at home that she may take as needed.         There are no discontinued medications.    Options for treatment and follow-up care were reviewed with the patient. Lissy Foote  engaged in the decision making process and verbalized understanding of the options discussed and agreed with the final plan.    Sirisha Ledbetter MD  Family Medicine PGY3 Resident

## 2018-10-30 NOTE — MR AVS SNAPSHOT
After Visit Summary   10/30/2018    Lissy Foote    MRN: [id number]           Patient Information     Date Of Birth          1995        Visit Information        Provider Department      10/30/2018 2:00 PM Ryan Ledbetter MD [facility]        Today's Diagnoses     Iron deficiency anemia, unspecified iron deficiency anemia type    -  1    Hair loss        Other fatigue          Care Instructions    We will do anemia studies today. Will also check your thyroid function.     I will release the results to you by [employer].           Follow-ups after your visit        Who to contact     Please call your clinic at [phone] to:    Ask questions about your health    Make or cancel appointments    Discuss your medicines    Learn about your test results    Speak to your doctor            Additional Information About Your Visit        [employer] Information     [employer] gives you secure access to your electronic health record. If you see a primary care provider, you can also send messages to your care team and make appointments. If you have questions, please call your primary care clinic.  If you do not have a primary care provider, please call [phone] and they will assist you.      [employer] is an electronic gateway that provides easy, online access to your medical records. With [employer], you can request a clinic appointment, read your test results, renew a prescription or communicate with your care team.     To access your existing account, please contact your [facility] or call [phone] for assistance.        Care EveryWhere ID     This is your Care EveryWhere ID. This could be used by other organizations to access your [place] medical records  [id number]        Your Vitals Were     Pulse Temperature Last Period Pulse Oximetry BMI (Body Mass Index)       75 97.9  F (36.6  C) (Oral) 10/19/2018 100% 19.16 kg/m2        Blood Pressure from Last 3  Encounters:   10/30/18 114/79   08/30/18 117/80   06/29/18 104/72    Weight from Last 3 Encounters:   10/30/18 126 lb (57.2 kg)   08/30/18 133 lb (60.3 kg)   06/29/18 131 lb 9.6 oz (59.7 kg)              We Performed the Following     CBC with platelets differential     Ferritin     IRON AND IRON BINDING CAPACITY     Transferrin     TSH with free T4 reflex        Primary Care Provider Office Phone # Fax #    Bria Fox Kingsley [phone]       [address] [facility] [address]        Equal Access to Services     [facility]: Hadii brie villanueva hadasho Soomaali, waaxda luqadaha, qaybta kaalmada karen, anne-marie mancia . So [facility] [phone].    ATENCIÓN: Si habla español, tiene a mendoza disposición servicios gratuitos de asistencia lingüística. [facility] [phone].    We comply with applicable federal civil rights laws and [place] laws. We do not discriminate on the basis of race, color, national origin, age, disability, sex, sexual orientation, or gender identity.            Thank you!     Thank you for choosing [facility]  for your care. Our goal is always to provide you with excellent care. Hearing back from our patients is one way we can continue to improve our services. Please take a few minutes to complete the written survey that you may receive in the mail after your visit with us. Thank you!             Your Updated Medication List - Protect others around you: Learn how to safely use, store and throw away your medicines at www.disposemymeds.org.          This list is accurate as of 10/30/18  2:38 PM.  Always use your most recent med list.                   Brand Name Dispense Instructions for use Diagnosis    adapalene 0.1 % cream    DIFFERIN    45 g    Apply topically At Bedtime    Acne vulgaris       clindamycin 1 % topical gel    CLINDAMAX    60 g    Apply topically daily    Acne vulgaris       EUCERIN CALMING DAILY MOIST Crea     396 g     Externally apply topically as needed    Dermatitis       polyethylene glycol powder    MIRALAX    510 g    Take 17 g (1 capful) by mouth daily    Chronic idiopathic constipation       psyllium 0.52 g capsule     100 capsule    Take 1 capsule (0.52 g) by mouth daily    Irritable bowel syndrome with both constipation and diarrhea

## 2018-10-30 NOTE — PATIENT INSTRUCTIONS
We will do anemia studies today. Will also check your thyroid function.     I will release the results to you by "[employer]t.

## 2018-11-07 ENCOUNTER — OFFICE VISIT (OUTPATIENT)
Dept: FAMILY MEDICINE | Facility: CLINIC | Age: 23
End: 2018-11-07

## 2018-11-07 VITALS
BODY MASS INDEX: 19.55 KG/M2 | WEIGHT: 128.6 LBS | OXYGEN SATURATION: 100 % | TEMPERATURE: 98.2 F | DIASTOLIC BLOOD PRESSURE: 65 MMHG | SYSTOLIC BLOOD PRESSURE: 99 MMHG | HEART RATE: 76 BPM

## 2018-11-07 DIAGNOSIS — Z00.00 HEALTHCARE MAINTENANCE: ICD-10-CM

## 2018-11-07 DIAGNOSIS — J02.9 VIRAL PHARYNGITIS: Primary | ICD-10-CM

## 2018-11-07 DIAGNOSIS — R07.0 THROAT PAIN: ICD-10-CM

## 2018-11-07 DIAGNOSIS — R79.0 ABNORMAL RESULT OF IRON PROFILE TESTING: ICD-10-CM

## 2018-11-07 PROBLEM — K59.00 CONSTIPATION: Status: ACTIVE | Noted: 2017-08-14

## 2018-11-07 LAB — S PYO AG THROAT QL IA.RAPID: NEGATIVE

## 2018-11-07 ASSESSMENT — ENCOUNTER SYMPTOMS
MYALGIAS: 0
APPETITE CHANGE: 0
SHORTNESS OF BREATH: 0
ARTHRALGIAS: 0
EYE ITCHING: 0
CHILLS: 0
SINUS PAIN: 0
HEADACHES: 1
EYE REDNESS: 0
SINUS PRESSURE: 0
RHINORRHEA: 0
FEVER: 1
ABDOMINAL PAIN: 0
DIARRHEA: 0
VOMITING: 0
EYE DISCHARGE: 0
COUGH: 0
TROUBLE SWALLOWING: 1
DIAPHORESIS: 0
DYSURIA: 0
SORE THROAT: 1
FATIGUE: 1
NAUSEA: 0

## 2018-11-07 NOTE — PROGRESS NOTES
HPI       Lissy Foote is a 23 year old  who presents for   Chief Complaint   Patient presents with     RECHECK     Labs     Pharyngitis     x3d swollen and sore throat. Patient felt warm       Acute Illness   Concerns: sore throat  When did it start? 3 days ago  Is it getting better, worse or staying the same? 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 No     Therapies Tried and outcome: Nothing    +++++++  Discuss lab results from 10/30/2018: labs completed include CBC, iron, iron saturation, ferritin, transferrin, TIBC (see below for results)    Discussed flu shot and patient agreed to have administered today.    Problem, Medication and Allergy Lists were reviewed and updated if needed..    Patient is an established patient of this clinic..         Review of Systems:   Review of Systems   Constitutional: Positive for fatigue and fever (subjective). Negative for appetite change, chills and diaphoresis.   HENT: Positive for ear pain, sore throat and trouble swallowing (due to sore throat). Negative for congestion, rhinorrhea, sinus pain and sinus pressure.    Eyes: Negative for discharge, redness and itching.   Respiratory: Negative for cough and shortness of breath.    Cardiovascular: Negative for chest pain.   Gastrointestinal: Negative for abdominal pain, diarrhea, nausea and vomiting.   Genitourinary: Negative for dysuria.   Musculoskeletal: Negative for arthralgias and myalgias.   Skin: Negative  for rash.   Neurological: Positive for headaches.            Physical Exam:     Vitals:    11/07/18 1129   BP: 99/65   Pulse: 76   Temp: 98.2  F (36.8  C)   TempSrc: Oral   SpO2: 100%   Weight: 128 lb 9.6 oz (58.3 kg)     Body mass index is 19.55 kg/(m^2).  Vitals were reviewed and were normal     Physical Exam   Constitutional: No distress.   HENT:   Head: Normocephalic and atraumatic.   Right Ear: External ear normal.   Left Ear: External ear normal.   Mouth/Throat: Posterior oropharyngeal erythema present. No oropharyngeal exudate.   Cardiovascular: Normal rate, regular rhythm and normal heart sounds.    Pulmonary/Chest: Effort normal and breath sounds normal. No respiratory distress. She has no wheezes. She has no rales.   Abdominal: Soft. There is no tenderness. There is no guarding.   Lymphadenopathy:     She has cervical adenopathy (anterior cervical LAD, non tender).   Neurological: She is alert.   Skin: No rash noted. She is not diaphoretic.   Psychiatric: She has a normal mood and affect. Her behavior is normal.       Results:      Results from this visit  Results for orders placed or performed in visit on 11/07/18   Strep Screen Rapid (Group) ([facility])   Result Value Ref Range    Rapid Strep A Screen NEGATIVE Negative     Component      Latest Ref Rng & Units 10/30/2018   WBC      4.0 - 11.0 10e9/L 3.8 (L)   RBC Count      3.8 - 5.2 10e12/L 5.08   Hemoglobin      11.7 - 15.7 g/dL 12.3   Hematocrit      35.0 - 47.0 % 41.6   MCV      78 - 100 fl 82   MCH      26.5 - 33.0 pg 24.2 (L)   MCHC      31.5 - 36.5 g/dL 29.6 (L)   RDW      10.0 - 15.0 % 17.8 (H)   Platelet Count      150 - 450 10e9/L 203   Diff Method       Automated Method   % Neutrophils      % 44.1   % Lymphocytes      % 41.9   % Monocytes      % 11.7   % Eosinophils      % 1.3   % Basophils      % 1.0   % Immature Granulocytes      % 0.0   Nucleated RBCs      0 /100 0   Absolute Neutrophil      1.6 - 8.3 10e9/L 1.7   Absolute Lymphocytes       0.8 - 5.3 10e9/L 1.6   Absolute Monocytes      0.0 - 1.3 10e9/L 0.5   Absolute Eosinophils      0.0 - 0.7 10e9/L 0.1   Absolute Basophils      0.0 - 0.2 10e9/L 0.0   Abs Immature Granulocytes      0 - 0.4 10e9/L 0.0   Absolute Nucleated RBC       0.0   Platelet Estimate       Confirming automated cell count   Iron      35 - 180 ug/dL 420 (H)   Iron Binding Cap      240 - 430 ug/dL 449 (H)   Iron Saturation Index      15 - 46 % 93 (H)   Ferritin      12 - 150 ng/mL 7 (L)   Transferrin      210 - 360 mg/dL 316     Assessment and Plan        1. Viral pharyngitis  2. Throat pain  Rapid strep completed and negative.  Will await results of culture.  Likely due to viral pharyngitis.  Supportive cares including Tylenol or ibuprofen for pain control.  May also use throat lozenges.  Return to clinic if symptoms are worsening.  - Strep Screen Rapid (Group) ([facility])  - Strep Culture ([facility])    3. Abnormal result of iron profile testing  Iron studies reviewed with patient.  Results are peculiar in that the ferritin is low at 7, iron is high at 420, high iron binding capacity, and high iron saturation index.  Patient has been taking over-the-counter iron supplement.  It is most likely that she has a very deficiency anemia.  We will have her stop the over-the-counter iron supplement and recheck labs in 4 weeks.  Further workup pending results.    4. Healthcare maintenance  Flu shot administered  - ADMIN VACCINE, INITIAL  - FLU VAC PRESRV FREE QUAD SPLIT VIR IM, 0.5 mL dosage       There are no discontinued medications.    Options for treatment and follow-up care were reviewed with the patient. Lissy Foote  engaged in the decision making process and verbalized understanding of the options discussed and agreed with the final plan.    Sirisha Ledbetter MD  Family Medicine PGY3 Resident

## 2018-11-07 NOTE — LETTER
November 7, 2018      Lissy Foote  [address] [address]        To whom it may concern,     Lissy is a patient under my care at [facility]. Due to illness and clinic visit she is unable to attend school on the following date: 11/7/2018. Please excuse her for this date.         Sincerely,        Sirisha Ledbetter MD

## 2018-11-07 NOTE — MR AVS SNAPSHOT
After Visit Summary   11/7/2018    Lissy Foote    MRN: [id number]           Patient Information     Date Of Birth          1995        Visit Information        Provider Department      11/7/2018 11:20 AM Ryan Ledbetter MD [facility]        Today's Diagnoses     Viral pharyngitis    -  1    Throat pain        Abnormal result of iron profile testing        Healthcare maintenance           Follow-ups after your visit        Follow-up notes from your care team     Return in about 4 weeks (around 12/5/2018).      Your next 10 appointments already scheduled     Nov 30, 2018  8:00 AM CST   LAB VISIT with [facility]   [facility] ([facility])    [address],  [address]  [place] [address]   [phone]           If you are coming in for fasting labs, you will need to fast for 10-12 hours prior to your appt. Fasting labs include lipids, cholesterol, glucose, complete metabolic panel, basic metabolic panel, and triglycerides. Do not drink coffee or any other fluids. Water with medications are okay. Do not chew gum as well. If you have any further questions, please contact your health care team.              Dec 05, 2018  8:00 AM CST   Return Visit with Ryan Ledbetter MD   [facility] ([facility])    [address],  [address]  [facility] [address]   [phone]              Future tests that were ordered for you today     Open Future Orders        Priority Expected Expires Ordered    CBC with Plt (Tommy) Routine  11/7/2019 11/7/2018    Transferrin Routine  11/7/2019 11/7/2018    Iron and iron binding capacity Routine  11/7/2019 11/7/2018    Ferritin Routine  11/7/2019 11/7/2018            Who to contact     Please call your clinic at [phone] to:    Ask questions about your health    Make or cancel appointments    Discuss your medicines    Learn about your test results    Speak to your doctor             Additional Information About Your Visit        [email or web address] Information     [employer] gives you secure access to your electronic health record. If you see a primary care provider, you can also send messages to your care team and make appointments. If you have questions, please call your primary care clinic.  If you do not have a primary care provider, please call [phone] and they will assist you.      [employer] is an electronic gateway that provides easy, online access to your medical records. With [employer], you can request a clinic appointment, read your test results, renew a prescription or communicate with your care team.     To access your existing account, please contact your [facility] or call [phone] for assistance.        Care EveryWhere ID     This is your Care EveryWhere ID. This could be used by other organizations to access your [place] medical records  [id number]        Your Vitals Were     Pulse Temperature Last Period Pulse Oximetry BMI (Body Mass Index)       76 98.2  F (36.8  C) (Oral) 10/19/2018 100% 19.55 kg/m2        Blood Pressure from Last 3 Encounters:   11/07/18 99/65   10/30/18 114/79   08/30/18 117/80    Weight from Last 3 Encounters:   11/07/18 128 lb 9.6 oz (58.3 kg)   10/30/18 126 lb (57.2 kg)   08/30/18 133 lb (60.3 kg)              We Performed the Following     ADMIN VACCINE, INITIAL     FLU VAC PRESRV FREE QUAD SPLIT VIR IM, 0.5 mL dosage     Strep Culture (GABS)     Strep Screen Rapid (Group) ([facility])        Primary Care Provider Office Phone # Fax #    Briataran Kingsley [phone]       [address] [facility] [address]        Equal Access to Services     [facility] : Hadii brie Bartlett, bebeto guy, anne-marie thorne. So [facility] [phone].    ATENCIÓN: Si habla español, tiene a mendoza disposición servicios gratuitos de asistencia lingüística. Llame al  [phone].    We comply with applicable federal civil rights laws and [place] laws. We do not discriminate on the basis of race, color, national origin, age, disability, sex, sexual orientation, or gender identity.            Thank you!     Thank you for choosing [facility]  for your care. Our goal is always to provide you with excellent care. Hearing back from our patients is one way we can continue to improve our services. Please take a few minutes to complete the written survey that you may receive in the mail after your visit with us. Thank you!             Your Updated Medication List - Protect others around you: Learn how to safely use, store and throw away your medicines at www.disposemymeds.org.      Notice  As of 11/7/2018 11:59 PM    You have not been prescribed any medications.

## 2018-11-09 LAB
BACTERIA SPEC CULT: NORMAL
Lab: NORMAL
SPECIMEN SOURCE: NORMAL

## 2018-11-27 NOTE — PROGRESS NOTES
Patient did not return to Physical Therapy to complete their plan of care, thus, full DC status is unknown. Please refer to the SOAP note dated 4/19/18 as well as subjective and objective reports copied below from the last visit for discharge information.   Subjective: pt reports the squatting exercise is really difficult. 2 days ago had pain in her leg again, felt it when she was sitting down, then just fluctuated a bit. but then when she was walking the pain moved up to her knee and was pretty bad. does not know why it happens, it seems so random. this was only the second time this year she had felt that pain. it maybe lasted 15 minutes. feels her balance is much better, actually hardly noticed any problems with walking and feeling off balance. the right knee does not crack as much as it used to  Objective: SL balance EC: L 45 sec, R 30 sec. mild right calf pain produced while in sitting. mild decr with rep knee ext in sitting  Their bout of care ranged from 3/29/18 to 4/19/18. Discharge patient from PT at this time.

## 2018-11-29 DIAGNOSIS — R79.0 ABNORMAL RESULT OF IRON PROFILE TESTING: ICD-10-CM

## 2018-11-29 LAB
FERRITIN SERPL-MCNC: 4 NG/ML (ref 12–150)
HCT VFR BLD AUTO: 40.4 % (ref 35–47)
HEMOGLOBIN: 12 G/DL (ref 11.7–15.7)
IRON SATN MFR SERPL: 7 % (ref 15–46)
IRON SERPL-MCNC: 30 UG/DL (ref 35–180)
MCH RBC QN AUTO: 25.2 PG (ref 26.5–35)
MCHC RBC AUTO-ENTMCNC: 29.7 G/DL (ref 32–36)
MCV RBC AUTO: 84.7 FL (ref 78–100)
PLATELET # BLD AUTO: 239 K/UL (ref 150–450)
RBC # BLD AUTO: 4.77 M/UL (ref 3.8–5.2)
TIBC SERPL-MCNC: 433 UG/DL (ref 240–430)
TRANSFERRIN SERPL-MCNC: 356 MG/DL (ref 210–360)
WBC # BLD AUTO: 4.2 K/UL (ref 4–11)

## 2019-05-08 ENCOUNTER — OFFICE VISIT (OUTPATIENT)
Dept: FAMILY MEDICINE | Facility: CLINIC | Age: 24
End: 2019-05-08
Payer: COMMERCIAL

## 2019-05-08 VITALS
OXYGEN SATURATION: 97 % | RESPIRATION RATE: 16 BRPM | HEART RATE: 77 BPM | BODY MASS INDEX: 19.43 KG/M2 | TEMPERATURE: 98.2 F | SYSTOLIC BLOOD PRESSURE: 105 MMHG | WEIGHT: 127.8 LBS | DIASTOLIC BLOOD PRESSURE: 72 MMHG

## 2019-05-08 DIAGNOSIS — E61.1 IRON DEFICIENCY: Primary | ICD-10-CM

## 2019-05-08 LAB
FERRITIN SERPL-MCNC: 26 NG/ML (ref 12–150)
HGB BLD-MCNC: 15.2 G/DL (ref 11.7–15.7)
IRON SATN MFR SERPL: 19 % (ref 15–46)
IRON SERPL-MCNC: 68 UG/DL (ref 35–180)
TIBC SERPL-MCNC: 357 UG/DL (ref 240–430)
TRANSFERRIN SERPL-MCNC: 279 MG/DL (ref 210–360)

## 2019-05-08 RX ORDER — FERROUS SULFATE 325(65) MG
325 TABLET, DELAYED RELEASE (ENTERIC COATED) ORAL DAILY
Qty: 90 TABLET | Refills: 0 | Status: SHIPPED | OUTPATIENT
Start: 2019-05-08 | End: 2019-06-06

## 2019-05-08 ASSESSMENT — ENCOUNTER SYMPTOMS
HEADACHES: 0
CONSTIPATION: 0
SHORTNESS OF BREATH: 0
MYALGIAS: 1
PALPITATIONS: 0
ROS SKIN COMMENTS: + HAIR LOSS
BLOOD IN STOOL: 0
FATIGUE: 1
UNEXPECTED WEIGHT CHANGE: 0

## 2019-05-08 NOTE — PROGRESS NOTES
HPI       Lissy Foote is a 24 year old  who presents for   Chief Complaint   Patient presents with     RECHECK     follow up labs     Lissy was last seen in clinic 11/29/2018 at which time she had labs completed consisting of iron studies to evaluate for anemia. Lab results consistent with iron deficiency (see results below). She reports symptoms of easy hair loss, overall fatigue, and leg cramps. She has been taking an OTC iron supplement which she stopped 1 month ago because she did not think it was making a difference.     Lissy is currently fasting for Ramadhan.     +++++++    Problem, Medication and Allergy Lists were reviewed and updated if needed..    Patient is an established patient of this clinic..         Review of Systems:   Review of Systems   Constitutional: Positive for fatigue. Negative for unexpected weight change.   Respiratory: Negative for shortness of breath.    Cardiovascular: Negative for palpitations.   Gastrointestinal: Negative for blood in stool and constipation.   Genitourinary: Negative for menstrual problem.   Musculoskeletal: Positive for myalgias.   Skin:        + hair loss   Neurological: Negative for headaches.            Physical Exam:     Vitals:    05/08/19 1046   BP: 105/72   Pulse: 77   Resp: 16   Temp: 98.2  F (36.8  C)   TempSrc: Oral   SpO2: 97%   Weight: 58 kg (127 lb 12.8 oz)     Body mass index is 19.43 kg/m .  Vitals were reviewed and were normal     Physical Exam   Constitutional: No distress.   HENT:   Head: Normocephalic and atraumatic.   Cardiovascular: Normal rate and regular rhythm.   Pulmonary/Chest: Effort normal and breath sounds normal.   Neurological: She is alert.   Skin: She is not diaphoretic.   Psychiatric: She has a normal mood and affect. Her behavior is normal.       Results:   Results from last visit:  Orders Only on 11/29/2018   Component Date Value Ref Range Status     WBC 11/29/2018 4.2  4.0 - 11.0 K/uL Final     RBC 11/29/2018 4.77  3.80 -  5.20 M/uL Final     Hemoglobin 11/29/2018 12.0  11.7 - 15.7 g/dL Final     Hematocrit 11/29/2018 40.4  35.0 - 47.0 % Final     MCV 11/29/2018 84.7  78.0 - 100.0 fL Final     MCH 11/29/2018 25.2* 26.5 - 35.0 pg Final     MCHC 11/29/2018 29.7* 32.0 - 36.0 g/dL Final     Platelets 11/29/2018 239.0  150.0 - 450.0 K/uL Final     Transferrin 11/29/2018 356  210 - 360 mg/dL Final     Iron 11/29/2018 30* 35 - 180 ug/dL Final     Iron Binding Cap 11/29/2018 433* 240 - 430 ug/dL Final     Iron Saturation Index 11/29/2018 7* 15 - 46 % Final     Ferritin 11/29/2018 4* 12 - 150 ng/mL Final       Assessment and Plan        1. Iron deficiency  Previous lab results reviewed and c/w with iron deficiency. Hb wnl at 12.0. Discussed PO vs IV iron replacement and given chronicity of iron deficiency along with lab results above, would recommend IV iron. Patient is agreeable to this, however she is currently fasting for Ramadhan during the day which also means no IVs unless absolutely necessary. She will have to break fast when her next period comes at the end of May/first week of June. She will then get the IV iron treatment. Will create an IV iron therapy plan. In the interim, will treat with PO iron supplement to take once daily after breaking fast during Ramadan.   - Hemoglobin  - Iron and iron binding capacity  - Transferrin  - Ferritin  - ferrous sulfate (FE TABS) 325 (65 Fe) MG EC tablet; Take 1 tablet (325 mg) by mouth daily  Dispense: 90 tablet; Refill: 0       There are no discontinued medications.    Options for treatment and follow-up care were reviewed with the patient. Lissy Foote  engaged in the decision making process and verbalized understanding of the options discussed and agreed with the final plan.    Sirisha Ledbetter MD  Family Medicine PGY3 Resident

## 2019-05-08 NOTE — PATIENT INSTRUCTIONS
We wlll check Iron labs today and treat you with IV iron when your next period comes. I will place the order for the IV iron and have my care coordinator at [facility] assist with scheduling the appointments.     In the mean time, take the iron pill daily when you break fast for Ramadan.

## 2019-06-06 ENCOUNTER — OFFICE VISIT (OUTPATIENT)
Dept: FAMILY MEDICINE | Facility: CLINIC | Age: 24
End: 2019-06-06
Payer: COMMERCIAL

## 2019-06-06 VITALS
BODY MASS INDEX: 19.04 KG/M2 | DIASTOLIC BLOOD PRESSURE: 70 MMHG | WEIGHT: 125.2 LBS | HEART RATE: 80 BPM | SYSTOLIC BLOOD PRESSURE: 110 MMHG | TEMPERATURE: 97.7 F | OXYGEN SATURATION: 100 %

## 2019-06-06 DIAGNOSIS — E61.1 IRON DEFICIENCY: Primary | ICD-10-CM

## 2019-06-06 NOTE — PATIENT INSTRUCTIONS
"Here is the plan from today's visit    Anemia -   \"lab-only\" visit in 3 months for ferritin and hemoglobin check  I will send [employer] message with results, follow up clinic visit if needed    Thank you for coming to [facility] today.  Lab Testing:  **If you had lab testing today and your results are reassuring or normal they will be mailed to you or sent through [employer] within 7 days.   **If the lab tests need quick action we will call you with the results.  The phone number we will call with results is # [phone] (home) . If this is not the best number please call our clinic and change the number.  Medication Refills:  If you need any refills please call your pharmacy and they will contact us.   If you need to  your refill at a new pharmacy, please contact the new pharmacy directly. The new pharmacy will help you get your medications transferred faster.   Scheduling:  If you have any concerns about today's visit or wish to schedule another appointment please call our office during normal business hours [phone] (8-5:00 M-F)  If a referral was made to a [facility] and you don't get a call from central scheduling please call [phone].  If a Mammogram was ordered for you at [facility] call [phone] to schedule or change your appointment.  If you had an XRay/CT/Ultrasound/MRI ordered the number is [phone] to schedule or change your radiology appointment.   Medical Concerns:  If you have urgent medical concerns please call [phone] at any time of the day.    Bria Kingsley MD    "

## 2019-06-06 NOTE — PROGRESS NOTES
HPI       Lissy Foote is a 24 year old  who presents for   Chief Complaint   Patient presents with     Anemia     Anemia  2 months of online iron supplement ended early April - was taking double the recommended dose. Normal iron labs 5/8/2019. Discussed IV iron, but didn't start due to Ramadan. Instead started PO iron (ferrous sulfate), which caused a lot of stomach discomfort and constipation, stopped PO ironafter 1 week.     Iron deficiency for many years, symptoms usually: Ice cravings, hair falling out, feeling tired all the time.   Symptoms now: fatigue (unchanged over many years, headaches last week while fasting (history of migraines)    Online iron: Blood Builder: 15mg vitamin C, 30mcg B12, 680 mcg folate , iron (bound to S. cervisae) 26mg    Due for pap - declines  Got HPV vaccinations     +++++++    Problem, Medication and Allergy Lists were reviewed and updated if needed..    Patient is an established patient of this clinic..         Review of Systems:   Review of Systems         Physical Exam:     Vitals:    06/06/19 0950   BP: 110/70   Pulse: 80   Temp: 97.7  F (36.5  C)   TempSrc: Oral   SpO2: 100%   Weight: 56.8 kg (125 lb 3.2 oz)     Body mass index is 19.04 kg/m .     Physical Exam   Constitutional: She appears well-developed and well-nourished.   HENT:   Head: Normocephalic and atraumatic.   Eyes: Conjunctivae are normal.   Cardiovascular: Normal rate.   Pulmonary/Chest: Effort normal. No respiratory distress.   Skin: Skin is warm and dry. No rash noted. No erythema. No pallor.   Vitals reviewed.      Results:   Results from last visit:  Office Visit on 05/08/2019   Component Date Value Ref Range Status     Hemoglobin 05/08/2019 15.2  11.7 - 15.7 g/dL Final     Iron 05/08/2019 68  35 - 180 ug/dL Final     Iron Binding Cap 05/08/2019 357  240 - 430 ug/dL Final     Iron Saturation Index 05/08/2019 19  15 - 46 % Final     Transferrin 05/08/2019 279  210 - 360 mg/dL Final     Ferritin 05/08/2019  "26  12 - 150 ng/mL Final       Assessment and Plan        Patient Instructions   Here is the plan from today's visit    Anemia -   Resolved after taking Blood Builder, no need for IV iron. Recommended continue this as it was well (GI) tolerated in the past. Discussed that fatigue may take a little longer to improve (if due to anemia) otherwise likely not related to her anemia.   Plan:  \"lab-only\" visit in 3 months for ferritin and hemoglobin check  I will send [employer] message with results, follow up clinic visit if needed    Pap - declined     Medications Discontinued During This Encounter   Medication Reason     ferrous sulfate (FE TABS) 325 (65 Fe) MG EC tablet Stopped by Patient       Options for treatment and follow-up care were reviewed with the patient. Lissy Foote  engaged in the decision making process and verbalized understanding of the options discussed and agreed with the final plan.    I spent 25 min face to face with the patient and >50% was spent counselling the patient about the above medical conditions, educating patient and discussing the recommendations and followup .    Bria Kingsley MD  [facility]   "

## 2019-10-23 NOTE — PROGRESS NOTES
"Clinic visit    Subjective       Lissy Foote is a 24 year old female, who presents with:  Chief Complaint   Patient presents with     Vaginal Problem     patient is complaining of yest infection and lab work        History obtained from patient      Vaginal Symptoms  Onset: 2 years, off and on     Description:  Vaginal Discharge: creamy, dark yellow/light brown  Itching (Pruritis): no  Burning sensation: Yes   Odor: Yes post-menses    Accompanying Signs & Symptoms:  Pain with Urination:no  Abdominal Pain: no  Fever: no    History:   Sexually active: no    Precipitating factors:   Recent Antibiotic Use: no     Alleviating factors:  nothing  Therapies Tried and outcome: nothing      Laboratory work: recent diagnosis of anemia.     Problem, Medication and Allergy Lists were reviewed and are current..    Patient is an established patient of this clinic.    ROS  Complete ROS negative except as described above.    Objective     Vital signs  /80   Pulse 95   Temp 98.6  F (37  C) (Oral)   Resp 16   Ht 1.73 m (5' 8.11\")   Wt 56.2 kg (123 lb 12.8 oz)   SpO2 95%   BMI 18.76 kg/m      Vitals were reviewed and were normal    General: Pleasant, alert, interactive. Well appearing.    HEENT: No signs of trauma. No rhinorrhea. Moist mucous membranes.   Eyes: Conjunctivae and sclera are normal. Pupils are equal.   Neck: Normal range of motion.   Resp: No respiratory distress. Normal breath sounds throughout without rales/wheezing.   CV: normal RRR, no murmurs. Normal capillary refill.  Abdomen: non-distended. Soft, no TTP. No rebound or guarding.   : external genitalia without lesions. White thick discharge, no blood in the vaginal vault. Normal appearing cervix with closed os. First pelvic exam, patient tolerated well. No bimanual performed. Pap and wet prep samples obtained.  MSK: no leg edema. No obvious deformities  Neuro: The patient is alert and interactive. Speech normal. No gross focal symptoms.  Skin: Warm and " dry. No lesions or sign of trauma noted.   Psych: Affect is appropriate and concordant with mood, behavior is normal.    Results     Results from this visit, rest pending    Results for orders placed or performed in visit on 10/24/19   Wet Prep ([facility])     Status: None   Result Value Ref Range    Yeast Wet Prep Present none    Motile Trichomonas Wet Prep Negative Negative    Clue Cells Wet Prep None NONE    WBC WET PREP 2-5 2 - 5    Bacteria Wet Prep Many None    pH Wet Prep <4.5 3.8 - 4.5    Odor Wet Prep None NONE       Assessment and Plan     Lissy Foote is a 24 year old female here for vaginal symptoms, review of labs. Reports no sexual activity.     Candidiasis of vagina  Consistent with clinical exam. Patient opted for PO treatment vs topical.   - Wet Prep ([facility])  - fluconazole (DIFLUCAN) 150 MG tablet; Take 1 tablet (150 mg) by mouth once for 1 dose  Dispense: 1 tablet; Refill: 0    Screening for human papillomavirus  Preventative health care  Patient requesting vitamin D levels despite recommendation to simply take vitamin D supplements. Agreeable to pap smear since we were already doing speculum exam for wet prep.   - Pap imaged thin layer screen only - recommended age 21 - 24 years  - Vitamin D Level    H/o iron deficiency  Will re-check basic labs. Has been taking iron daily.  - Ferritin  - Hemoglobin     Come back as needed, will contact if abnormal results.     Options for treatment and follow-up care were reviewed with the patient/caregivers. They engaged in the decision making process and verbalized understanding of the options discussed and agreed with the final plan.      Terrie Oh MD  Family Medicine Resident [facility] PGY-2  Pager: [phone]       There are no discontinued medications.

## 2019-10-24 ENCOUNTER — OFFICE VISIT (OUTPATIENT)
Dept: FAMILY MEDICINE | Facility: CLINIC | Age: 24
End: 2019-10-24
Payer: COMMERCIAL

## 2019-10-24 VITALS
SYSTOLIC BLOOD PRESSURE: 113 MMHG | HEART RATE: 95 BPM | DIASTOLIC BLOOD PRESSURE: 80 MMHG | BODY MASS INDEX: 18.76 KG/M2 | WEIGHT: 123.8 LBS | RESPIRATION RATE: 16 BRPM | OXYGEN SATURATION: 95 % | HEIGHT: 68 IN | TEMPERATURE: 98.6 F

## 2019-10-24 DIAGNOSIS — E61.1 IRON DEFICIENCY: ICD-10-CM

## 2019-10-24 DIAGNOSIS — Z00.00 PREVENTATIVE HEALTH CARE: ICD-10-CM

## 2019-10-24 DIAGNOSIS — Z11.51 SCREENING FOR HUMAN PAPILLOMAVIRUS: Primary | ICD-10-CM

## 2019-10-24 DIAGNOSIS — B37.31 CANDIDIASIS OF VAGINA: ICD-10-CM

## 2019-10-24 LAB
BACTERIA: NORMAL
CLUE CELLS: NORMAL
FERRITIN SERPL-MCNC: 18 NG/ML (ref 12–150)
HGB BLD-MCNC: 14.2 G/DL (ref 11.7–15.7)
MOTILE TRICHOMONAS: NEGATIVE
ODOR: NORMAL
PH WET PREP: <4.5 (ref 3.8–4.5)
WBC WET PREP: NORMAL (ref 2–5)
YEAST: PRESENT

## 2019-10-24 RX ORDER — FLUCONAZOLE 150 MG/1
150 TABLET ORAL ONCE
Qty: 1 TABLET | Refills: 0 | Status: SHIPPED | OUTPATIENT
Start: 2019-10-24 | End: 2019-12-05

## 2019-10-24 ASSESSMENT — MIFFLIN-ST. JEOR: SCORE: 1371.8

## 2019-10-24 ASSESSMENT — PAIN SCALES - GENERAL: PAINLEVEL: NO PAIN (0)

## 2019-10-24 NOTE — PATIENT INSTRUCTIONS
Here is the summary from today's visit.     Preventative health care  - Pap imaged thin layer screen only - recommended age 21 - 24 years  - Vitamin D Level    Candidiasis of vagina  - Wet Prep ([facility])    Vaginal Infection: Yeast (Candidiasis)  Yeast infection occurs when yeast in the vagina increase and attacks the vaginal tissues. Yeast is a type of fungus. These infections are often caused by a type of yeast called Candida albicans. Other species of yeast can also cause infections. Factors that may make infection more likely include recent antibiotic use, douching, or increased sex. Yeast infections are more common in women who have diabetes, or are obese or pregnant, or have a weak immune system.  Symptoms of yeast infection    Clumpy or thin, white discharge, which may look like cottage cheese    No odor or minimal odor    Severe vaginal itching or burning    Burning with urination    Swelling, redness of vulva    Pain during sex  Treating yeast infection  Yeast infection is treated with a vaginal antifungal cream. In some cases, antifungal pills are prescribed instead. During treatment:    Finish all of your medicine, even if your symptoms go away.    Apply the cream before going to bed. Lie flat after applying so that it doesn't drip out.    Do not douche or use tampons.    Don't rely on a diaphragm or condoms, since the cream may weaken them.    Avoid intercourse if advised by your healthcare provider.     Should I treat a yeast infection myself?  Discuss with your healthcare provider whether you should use over-the-counter medicines to treat a yeast infection. Self-treatment may depend on whether:    You've had a yeast infection in the past.    You're at risk for STDs.  Call your healthcare provider if symptoms do not go away or come back after treatment.   Date Last Reviewed: 3/1/2017    1609-9833 [email or web address]. [address] [place] [address], [place] [address]. All rights reserved. This information  is not intended as a substitute for professional medical care. Always follow your healthcare professional's instructions.           - fluconazole (DIFLUCAN) 150 MG tablet; Take 1 tablet (150 mg) by mouth once for 1 dose  Dispense: 1 tablet; Refill: 0     Iron deficiency  - Ferritin  - Hemoglobin        Follow up plan  Come back as needed      Thank you for coming to [facility] today!  Terrie Oh MD   >>>>> <<<<<  If you had laboratory testing and results need quick action we will call you at # [phone] (home) . If this is not the best number, please call our clinic or stop by the  to update your contact information.  If you need any refills please call your pharmacy and they will contact us. If you need to  your refill at a new pharmacy, please contact the new pharmacy directly. The new pharmacy will help you get your medications transferred faster.   If you have any concerns about today's visit or wish to schedule another appointment, please call our office during normal business hours [phone] (8-5:00 M-F)  If a referral was made to a [facility] and you don't get a call from central scheduling, please call [phone].  If a mammogram was ordered, call [facility] at [phone] to schedule or change your appointment.  If you had an XRay/CT/Ultrasound/MRI ordered, the number is [phone] to schedule or change your radiology appointment.     If you have urgent medical concerns please call [phone] at any time of the day.

## 2019-10-24 NOTE — LETTER
November 3, 2019      Lissy Foote  [address]  [facility] [address]        Dear Lissy,    Thank you for getting your care at [facility]. Please see below for your test results.    Resulted Orders   Wet Prep ([facility])   Result Value Ref Range    Yeast Wet Prep Present none    Motile Trichomonas Wet Prep Negative Negative    Clue Cells Wet Prep None NONE    WBC WET PREP 2-5 2 - 5    Bacteria Wet Prep Many None    pH Wet Prep <4.5 3.8 - 4.5    Odor Wet Prep None NONE   Pap imaged thin layer screen only - recommended age 21 - 24 years   Result Value Ref Range    PAP NIL     Copath Report         Acc#: [id number]   Signed: 10/30/2019 09:23   MR#: [id number]    SPECIMEN/STAIN PROCESS:  Pap imaged thin layer prep screening (Surepath, FocalPoint with guided   screening)       Pap-Cyto x 1    SOURCE: Cervical, endocervical  ----------------------------------------------------------------   Pap imaged thin layer prep screening (Surepath, FocalPoint with guided   screening)  SPECIMEN ADEQUACY:  Satisfactory for evaluation.  -Transformation zone component present.    CYTOLOGIC INTERPRETATION:    Negative for intraepithelial lesion or malignancy    Electronically signed by:  Liliane RAGLAND ([facility])    CLINICAL HISTORY:    Papanicolaou Test Limitations:  Cervical cytology is a screening test with   limited sensitivity; regular  screening is critical for cancer prevention; Pap tests are primarily   effective for the diagnosis/prevention of  squamous cell carcinoma, not adenocarcinomas or other cancers.    The technical component of this testing was completed at the [facility], with the professional component performed   at the [facility], [address] [place] [address],   [place] [address] ([phone])       Hemoglobin   Result Value Ref Range    Hemoglobin 14.2 11.7 - 15.7 g/dL   Vitamin D Level   Result  Value Ref Range    Vitamin D Deficiency screening 24 20 - 75 ug/L      Comment:      Season, race, dietary intake, and treatment affect the concentration of   25-hydroxy-Vitamin D. Values may decrease during winter months and increase   during summer months. Values 20-29 ug/L may indicate Vitamin D insufficiency   and values <20 ug/L may indicate Vitamin D deficiency.  Vitamin D determination is routinely performed by an immunoassay specific for   25 hydroxyvitamin D3.  If an individual is on vitamin D2 (ergocalciferol)   supplementation, please specify 25 OH vitamin D2 and D3 level determination by   LCMSMS test VITD23.       Congrats on your first pap smear! You did awesome and it looks totally normal! You are not due for a repeat until 3 years from now.     The rest of the results also look good, other than the yeast that we discussed in clinic. I hope your symptoms have improved. As I said in clinic, I recommend that you take a vitamin D supplement despite your normal levels.     If you have any concerns about these results please call and leave a message for me or send a MyChart message to the clinic.    Sincerely,    Terrie Oh MD

## 2019-10-24 NOTE — PROGRESS NOTES
Preceptor Attestation:   Patient seen, evaluated and discussed with the resident. I have verified the content of the note, which accurately reflects my assessment of the patient and the plan of care.   Supervising Physician:  Bria Kingsley MD

## 2019-10-25 LAB — DEPRECATED CALCIDIOL+CALCIFEROL SERPL-MC: 24 UG/L (ref 20–75)

## 2019-10-30 LAB
COPATH REPORT: NORMAL
PAP: NORMAL

## 2019-10-31 PROBLEM — B37.31 CANDIDIASIS OF VAGINA: Status: ACTIVE | Noted: 2019-10-31

## 2019-11-11 ENCOUNTER — OFFICE VISIT (OUTPATIENT)
Dept: FAMILY MEDICINE | Facility: CLINIC | Age: 24
End: 2019-11-11
Payer: COMMERCIAL

## 2019-11-11 VITALS
WEIGHT: 122 LBS | DIASTOLIC BLOOD PRESSURE: 82 MMHG | HEIGHT: 68 IN | OXYGEN SATURATION: 100 % | SYSTOLIC BLOOD PRESSURE: 119 MMHG | BODY MASS INDEX: 18.49 KG/M2 | RESPIRATION RATE: 16 BRPM | HEART RATE: 82 BPM | TEMPERATURE: 98.1 F

## 2019-11-11 DIAGNOSIS — R35.0 URINARY FREQUENCY: Primary | ICD-10-CM

## 2019-11-11 DIAGNOSIS — R80.9 PROTEINURIA, UNSPECIFIED TYPE: ICD-10-CM

## 2019-11-11 DIAGNOSIS — R19.7 DIARRHEA, UNSPECIFIED TYPE: ICD-10-CM

## 2019-11-11 LAB
BILIRUBIN UR: NEGATIVE MG/DL
BLOOD UR: NEGATIVE MG/DL
GLUCOSE URINE: NEGATIVE
KETONES UR QL: NEGATIVE MG/DL
LEUKOCYTE ESTERASE UR: NEGATIVE
NITRITE UR QL STRIP: NEGATIVE MG/DL
PH UR STRIP: 8.5 [PH] (ref 4.5–8)
PROTEIN UR: ABNORMAL MG/DL
SP GR UR STRIP: 1.02 (ref 1–1.03)
UROBILINOGEN UR STRIP-ACNC: ABNORMAL E.U./DL

## 2019-11-11 ASSESSMENT — ENCOUNTER SYMPTOMS
HEMATURIA: 0
FREQUENCY: 1
ABDOMINAL PAIN: 1
EYES NEGATIVE: 1
ALLERGIC/IMMUNOLOGIC NEGATIVE: 1
WHEEZING: 0
CHILLS: 0
SHORTNESS OF BREATH: 0
DYSURIA: 1
ABDOMINAL DISTENTION: 0
JOINT SWELLING: 0
VOMITING: 0
NAUSEA: 0
CONSTIPATION: 0
PSYCHIATRIC NEGATIVE: 1
FEVER: 0
FLANK PAIN: 0
ENDOCRINE NEGATIVE: 1
FATIGUE: 0
CHEST TIGHTNESS: 0
PALPITATIONS: 0
ARTHRALGIAS: 0
BACK PAIN: 1
HEMATOLOGIC/LYMPHATIC NEGATIVE: 1
DIFFICULTY URINATING: 0
COUGH: 0
NEUROLOGICAL NEGATIVE: 1
MYALGIAS: 0
DIARRHEA: 1

## 2019-11-11 ASSESSMENT — MIFFLIN-ST. JEOR: SCORE: 1361.89

## 2019-11-11 NOTE — PATIENT INSTRUCTIONS
Here is the plan from today's visit    Urinary frequency  Please return for labs:  - Wet Prep ([facility]); Future  - CBC with Diff Plt ([facility]); Future  - Comprehensive Metabolic Panel ([facility]); Future    We have also added on some labs to today's sample to find out how much protein is in your urine, and to test for atypical infections.     We will let you know results on [facility]    Please call or return to clinic if your symptoms don't go away.    Follow up plan  Please make a clinic appointment for follow up with your primary physician Bria Kingsley MD as needed.    Thank you for coming to [facility] today.  Lab Testing:  **If you had lab testing today and your results are reassuring or normal they will be mailed to you or sent through [employer] within 7 days.   **If the lab tests need quick action we will call you with the results.  The phone number we will call with results is # [phone] (home) . If this is not the best number please call our clinic and change the number.  Medication Refills:  If you need any refills please call your pharmacy and they will contact us.   If you need to  your refill at a new pharmacy, please contact the new pharmacy directly. The new pharmacy will help you get your medications transferred faster.   Scheduling:  If you have any concerns about today's visit or wish to schedule another appointment please call our office during normal business hours [phone] (8-5:00 M-F)  If a referral was made to a [facility] and you don't get a call from central scheduling please call [phone].  If a Mammogram was ordered for you at [facility] call [phone] to schedule or change your appointment.  If you had an XRay/CT/Ultrasound/MRI ordered the number is [phone] to schedule or change your radiology appointment.   Medical Concerns:  If you have urgent medical concerns please call [phone] at any time of the day.    Leila  MICAH Gamez MD

## 2019-11-11 NOTE — PROGRESS NOTES
"urin       HPI       Lissy Foote is a 24 year old  who presents for   Chief Complaint   Patient presents with     RECHECK     urinary frequency issues off and on throughout the last 1 yr. Burning during urination sensation went away for a few days then came back. Warm sensation and abdominal pain and low back pain. Itching and odor went away per pt        {Superlists :630221:x}    {:355347}   +++++++  {Additional Concerns  (FM):186684}    Problem, Medication and Allergy Lists were {Reviewed Lists:547695}.    Patient is {New/Established:857307::\"an established patient of this clinic.\"}.         Review of Systems:   Review of Systems         Physical Exam:     Vitals:    11/11/19 1644   BP: 119/82   Pulse: 82   Resp: 16   Temp: 98.1  F (36.7  C)   TempSrc: Oral   SpO2: 100%   Weight: 55.3 kg (122 lb)   Height: 1.727 m (5' 8\")     Body mass index is 18.55 kg/m .  {[facility] VITALS OPTIONS:974947::\"Vitals were reviewed and were normal\"}     Physical Exam  {  Detailed Ortho and mental status exam:311690}    Results:   {UMP FM result choices :308357}    Assessment and Plan        {Assessment/Plan Pick List :547325}       There are no discontinued medications.    Options for treatment and follow-up care were reviewed with the patient. Lissy Foote  engaged in the decision making process and verbalized understanding of the options discussed and agreed with the final plan.    Leila Gamez MD  "

## 2019-11-11 NOTE — PROGRESS NOTES
I was present with the medical student who participated in the service and in the documentation of this note. I have verified the history and personally performed the physical exam and medical decision making, and have verified the content of the note, which accurately reflects my assessment of the patient and the plan of care.   Leila Gamez MD    Add on labs:  - UACR  - UCx  - Ureaplasma PCR  To return for labs: CBC, CMP, repeat wet prep           HPI       Lissy Foote is a 24 year old  who presents for   Chief Complaint   Patient presents with     RECHECK     urinary frequency issues off and on throughout the last 1 yr. Burning during urination sensation went away for a few days then came back. Warm sensation and abdominal pain and low back pain. Itching and odor went away per pt    Chronology of Symptoms  Last year (8/30/2018) had same symptoms- dysuria with burning sensation, urinary urgency and increased frequency with small voided volumes. Was seen by Dr. Ledbetter and prescribed bactrim for UTI. About 12 hours after first dose of bactrim, developed severe chest pain and dyspnea. Was seen in ED, told to discontinue bactrim. Did not receive a substitute antibiotic, but symptoms resolved some time after the ED visit and were absent for about 6 months.    A few months ago, started having intermittent bouts of same urinary symptoms- dysuria with burning sensation, urinary urgency and increased frequency with small voided volumes. Symptoms would appear and resolve multiple times without ever being constant.    Prior to Today's Visit  Last seen in clinic 10/24/2019, diagnosed with yeast infection after noticing an itching sensation and odor. Was given diflucan. After medication, all symptoms resolved for about 6 days.    Today  Urinary symptoms (dysuria with burning on urination, urinary urgency, increased frequency with small voided volumes) began to return a few days ago (no odor or itch is present). About 2 days  ago, the urinary symptoms went from being intermittent to constant, and she also developed a feverish sensation as well as some transverse lower abdominal pain, pain radiating very mildly to the back, and diarrhea. She doesn't feel like the diarrhea is related to the urinary symptoms but thinks the the lower abdominal and back pain might be related to her urinary symptoms. She has not experienced chills, costovertebral angle tenderness, hematuria, vaginal discharge, edema of the extremities or eyes, nausea, or vomiting.    She is not sexually active. Periods are regular and are associated with back pain and discomfort at the beginning and end. Last menstrual period ended about 4-5 days ago and last about 5 days (about 11/03/2019-11/07/2019). Only unusual symptom during this period was that she had much less discomfort with her period after taking the diflucan.    About 3 weeks ago, she began taking a lot of probiotics, but she stopped about 1 week ago, before she developed diarrhea. She was not experiencing much constipation before the diarrhea.    She has had negative workup with Celiac antibodies. She notes a maternal history of diabetes and denies family history of autoimmune diseases like diabetes type I, MS, psoriasis, Lupus, thyroid disorders.        Problem, Medication and Allergy Lists were reviewed and updated if needed..    Patient is an established patient of this clinic..         Review of Systems:   Review of Systems   Constitutional: Negative for chills, fatigue and fever (possible subjective fever).   HENT: Negative.    Eyes: Negative.    Respiratory: Negative for cough, chest tightness, shortness of breath and wheezing.    Cardiovascular: Negative for chest pain, palpitations and leg swelling.   Gastrointestinal: Positive for abdominal pain (trasnverse through the RLQ and LLQ) and diarrhea. Negative for abdominal distention, constipation, nausea and vomiting.   Endocrine: Negative.    Genitourinary:  "Positive for dysuria, frequency and urgency. Negative for decreased urine volume (total volume not decreased, but amounts voided do not correlate with urgency and frequency), difficulty urinating, flank pain, hematuria, menstrual problem, vaginal bleeding, vaginal discharge and vaginal pain.   Musculoskeletal: Positive for back pain (generalized lower thoracic and lumbar pain). Negative for arthralgias, joint swelling and myalgias.   Skin: Negative.    Allergic/Immunologic: Negative.    Neurological: Negative.    Hematological: Negative.    Psychiatric/Behavioral: Negative.             Physical Exam:     Vitals:    11/11/19 1644   BP: 119/82   Pulse: 82   Resp: 16   Temp: 98.1  F (36.7  C)   TempSrc: Oral   SpO2: 100%   Weight: 55.3 kg (122 lb)   Height: 1.727 m (5' 8\")     Body mass index is 18.55 kg/m .  Vitals were reviewed and were normal     Physical Exam  Constitutional:       General: She is not in acute distress.     Appearance: Normal appearance. She is normal weight. She is not ill-appearing, toxic-appearing or diaphoretic.   HENT:      Head: Normocephalic and atraumatic.      Mouth/Throat:      Mouth: Mucous membranes are moist.      Pharynx: Oropharynx is clear.   Eyes:      Extraocular Movements: Extraocular movements intact.      Conjunctiva/sclera: Conjunctivae normal.      Pupils: Pupils are equal, round, and reactive to light.   Neck:      Musculoskeletal: Normal range of motion.   Cardiovascular:      Rate and Rhythm: Normal rate and regular rhythm.      Heart sounds: Normal heart sounds.   Pulmonary:      Effort: Pulmonary effort is normal. No respiratory distress.      Breath sounds: Normal breath sounds. No wheezing, rhonchi or rales.   Abdominal:      General: Abdomen is flat. There is no distension.      Palpations: Abdomen is soft. There is no mass.      Tenderness: There is tenderness (no tenderness with deep palpation over the RLQ and LLQ, suprapubic region, adnexae; most notable tenderness " in the RUQ with discomfort with Moore's sign; McBurney's point nontender). There is no right CVA tenderness, left CVA tenderness, guarding or rebound.   Musculoskeletal: Normal range of motion.   Skin:     General: Skin is warm and dry.      Capillary Refill: Capillary refill takes 2 to 3 seconds.      Coloration: Skin is not jaundiced or pale.      Findings: No rash.   Neurological:      General: No focal deficit present.      Mental Status: She is alert and oriented to person, place, and time.      Sensory: No sensory deficit.      Motor: No weakness.      Coordination: Coordination normal.      Gait: Gait normal.   Psychiatric:         Mood and Affect: Mood normal.         Behavior: Behavior normal.         Thought Content: Thought content normal.         Judgement: Judgment normal.           Results:      Results from this visit  Results for orders placed or performed in visit on 11/11/19   Urinalysis, Micro If (UA) ([facility])     Status: Abnormal   Result Value Ref Range    Specific Gravity Urine 1.020 1.005 - 1.030    pH Urine 8.5 (H) 4.5 - 8.0    Leukocyte Esterase UR Negative NEGATIVE    Nitrite Urine Negative NEGATIVE mg/dL    Protein UR 2+ (A) NEGATIVE mg/dL    Glucose Urine Negative NEGATIVE    Ketones Urine Negative NEGATIVE mg/dL    Urobilinogen mg/dL 0.2 E.U./dL 0.2 E.U./dL E.U./dL    Bilirubin UR Negative NEGATIVE mg/dL    Blood UR Negative NEGATIVE mg/dL       Assessment and Plan        1. Urinary frequency  Urinary symptoms suspicious for UTI despite UA demonstrating proteinuria without leukocyte esterase or nitrites. Recommend urine culture and ureaplasma PCR to further rule out occult or atypical infection. Repeat vaginal swab and wet prep to reassess for recurrent or persistent vaginal candidiasis. Also plan for CBC to further investigate possible infectious processes and to reassess iron deficiency anemia.  Will make appointment for additional labs. Notify with results via [employer].  -  Urinalysis, Micro If (UA) ([facility])  - Wet Prep ([facility]); Future  - Ureaplasma species PCR  - Urine Culture Aerobic Bacterial    2. Proteinuria, unspecified type  No symptoms of nephrotic or nephritic syndromes or other nephropathy at this time. Plan to check albumin/creatinine ratio to rule out nephropathy in addition to infectious workup detailed above. Plan for CMP to be done fasting for fasting blood glucose to rule out possibility of diabetic nephropathy, and to assess kidney function and electrolytes.  - Albumin Random Urine Quantitative with Creat Ratio  - CBC with Diff Plt ([facility]); Future  - Comprehensive Metabolic Panel ([facility]); Future  - Ureaplasma species PCR    3. Diarrhea, unspecified type  Unclear whether diarrhea is of infectious origin with overlap of urinary symptoms at the time of Ms. Foote's presentation. Plan to check CBC for indicators of inflammatory or infectious etiology, CMP to assess electrolyte status, blood glucose levels, and liver function to rule out other non-infectious sources of diarrhea, especially given RUQ tenderness.  - CBC with Diff Plt ([facility]); Future  - Comprehensive Metabolic Panel ([facility]); Future       There are no discontinued medications.    Options for treatment and follow-up care were reviewed with the patient. Lissy Foote  engaged in the decision making process and verbalized understanding of the options discussed and agreed with the final plan.    Korin Silvestre, MS3      Leila Gamez MD

## 2019-11-12 DIAGNOSIS — R19.7 DIARRHEA, UNSPECIFIED TYPE: ICD-10-CM

## 2019-11-12 DIAGNOSIS — R80.9 PROTEINURIA, UNSPECIFIED TYPE: ICD-10-CM

## 2019-11-12 DIAGNOSIS — R35.0 URINARY FREQUENCY: ICD-10-CM

## 2019-11-12 LAB
% GRANULOCYTES: 50.5 %G (ref 40–75)
ALBUMIN SERPL-MCNC: 4.8 MG/DL (ref 3.8–5)
ALP SERPL-CCNC: 35.2 U/L (ref 31.7–110.5)
ALT SERPL-CCNC: 10.1 U/L (ref 0–45)
AST SERPL-CCNC: 18.6 U/L (ref 0–45)
BACTERIA: NORMAL
BILIRUB SERPL-MCNC: 1.1 MG/DL (ref 0.2–1.3)
BUN SERPL-MCNC: 10.3 MG/DL (ref 7–19)
CALCIUM SERPL-MCNC: 9.6 MG/DL (ref 8.5–10.1)
CHLORIDE SERPLBLD-SCNC: 99.5 MMOL/L (ref 98–110)
CLUE CELLS: NORMAL
CO2 SERPL-SCNC: 25 MMOL/L (ref 20–32)
CREAT SERPL-MCNC: 0.6 MG/DL (ref 0.5–1)
CREAT UR-MCNC: 207 MG/DL
GFR SERPL CREATININE-BSD FRML MDRD: >90 ML/MIN/1.7 M2
GLUCOSE SERPL-MCNC: 86.3 MG'DL (ref 70–99)
GRANULOCYTES #: 1.8 K/UL (ref 1.6–8.3)
HCT VFR BLD AUTO: 46.2 % (ref 35–47)
HEMOGLOBIN: 14.7 G/DL (ref 11.7–15.7)
LYMPHOCYTES # BLD AUTO: 1.4 K/UL (ref 0.8–5.3)
LYMPHOCYTES NFR BLD AUTO: 38.7 %L (ref 20–48)
MCH RBC QN AUTO: 30.5 PG (ref 26.5–35)
MCHC RBC AUTO-ENTMCNC: 31.8 G/DL (ref 32–36)
MCV RBC AUTO: 95.9 FL (ref 78–100)
MICROALBUMIN UR-MCNC: 415 MG/L
MICROALBUMIN/CREAT UR: 200.48 MG/G CR (ref 0–25)
MID #: 0.4 K/UL (ref 0–2.2)
MID %: 10.8 %M (ref 0–20)
MOTILE TRICHOMONAS: NEGATIVE
ODOR: NORMAL
PH WET PREP: <4.5 (ref 3.8–4.5)
PLATELET # BLD AUTO: 163 K/UL (ref 150–450)
POTASSIUM SERPL-SCNC: 4 MMOL/DL (ref 3.3–4.5)
PROT SERPL-MCNC: 7.8 G/DL (ref 6.8–8.8)
RBC # BLD AUTO: 4.82 M/UL (ref 3.8–5.2)
SODIUM SERPL-SCNC: 136.6 MMOL/L (ref 132.6–141.4)
WBC # BLD AUTO: 3.5 K/UL (ref 4–11)
WBC WET PREP: NORMAL (ref 2–5)
YEAST: PRESENT

## 2019-11-13 ENCOUNTER — TELEPHONE (OUTPATIENT)
Dept: FAMILY MEDICINE | Facility: CLINIC | Age: 24
End: 2019-11-13

## 2019-11-13 LAB
BACTERIA SPEC CULT: NORMAL
Lab: NORMAL
SPECIMEN SOURCE: NORMAL

## 2019-11-13 NOTE — RESULT ENCOUNTER NOTE
José Luis Yuan  From your results, it seems that the yeast is still present as you had suspected.   We can try another dose of the oral medication, or a different form of yeast medication, which is a topical cream applied into the vagina. Let me know which one you prefer and I will send it to your pharmacy.    Leila Gamez MD

## 2019-11-14 LAB
SPECIMEN SOURCE: NORMAL
U PARVUM DNA SPEC QL NAA+PROBE: NEGATIVE
U UREALYTICUM DNA SPEC QL NAA+PROBE: NEGATIVE

## 2019-12-06 ENCOUNTER — OFFICE VISIT (OUTPATIENT)
Dept: FAMILY MEDICINE | Facility: CLINIC | Age: 24
End: 2019-12-06
Payer: COMMERCIAL

## 2019-12-06 VITALS
BODY MASS INDEX: 19.01 KG/M2 | WEIGHT: 125 LBS | TEMPERATURE: 97.9 F | OXYGEN SATURATION: 100 % | HEART RATE: 77 BPM | SYSTOLIC BLOOD PRESSURE: 123 MMHG | DIASTOLIC BLOOD PRESSURE: 81 MMHG

## 2019-12-06 DIAGNOSIS — B37.31 VULVOVAGINAL CANDIDIASIS: Primary | ICD-10-CM

## 2019-12-06 DIAGNOSIS — R30.0 DYSURIA: ICD-10-CM

## 2019-12-06 DIAGNOSIS — Z13.9 SCREENING FOR CONDITION: ICD-10-CM

## 2019-12-06 LAB
BACTERIA: NORMAL
BACTERIA: NORMAL
BILIRUBIN UR: NEGATIVE MG/DL
BLOOD UR: ABNORMAL MG/DL
CASTS: NORMAL /LPF
CLUE CELLS: NORMAL
CRYSTAL URINE: NORMAL /LPF
EPITHELIAL CELLS UR: NORMAL /LPF (ref 0–2)
GLUCOSE URINE: NEGATIVE
HCG UR QL: NEGATIVE
KETONES UR QL: NEGATIVE MG/DL
LEUKOCYTE ESTERASE UR: NEGATIVE
MOTILE TRICHOMONAS: NEGATIVE
MUCOUS URINE: NORMAL LPF
NITRITE UR QL STRIP: NEGATIVE MG/DL
ODOR: NORMAL
PH UR STRIP: 8 [PH] (ref 4.5–8)
PH WET PREP: NORMAL (ref 3.8–4.5)
PROTEIN UR: NEGATIVE MG/DL
RBC URINE: <2 /HPF
SP GR UR STRIP: 1.02 (ref 1–1.03)
UROBILINOGEN UR STRIP-ACNC: ABNORMAL E.U./DL
WBC URINE: NORMAL /HPF
WBC WET PREP: <2 (ref 2–5)
YEAST: NORMAL

## 2019-12-06 RX ORDER — FLUCONAZOLE 150 MG/1
150 TABLET ORAL DAILY
Qty: 3 TABLET | Refills: 0 | Status: SHIPPED | OUTPATIENT
Start: 2019-12-06 | End: 2019-12-09

## 2019-12-06 ASSESSMENT — ENCOUNTER SYMPTOMS
FEVER: 0
MYALGIAS: 0
FLANK PAIN: 0
FREQUENCY: 1
ABDOMINAL PAIN: 1
VOMITING: 0
CHILLS: 0
ARTHRALGIAS: 0
HEMATURIA: 0
DIARRHEA: 1
BACK PAIN: 0
NAUSEA: 0

## 2019-12-06 NOTE — PROGRESS NOTES
Preceptor Attestation:   Patient seen and discussed with the resident. Assessment and plan reviewed with resident and agreed upon.   Supervising Physician:  Bari Guerrero MD  [facility]

## 2019-12-06 NOTE — PROGRESS NOTES
STEVE Foote is a 24 year old female with PMH significant for multiple vaginal yeast infections who presents to clinic with dysuria and increased frequency in urination.     Chief Complaint   Patient presents with     UTI     Concerns of UTI - Frequent Urination, burning when urinating        Patient was previously treated for vaginal yeast infections around 11/11 and 10/24 with single dose diflucan.  She returns to clinic today with recurrence of her symptoms.  This includes increased frequency of urination and dysuria.  She denies vaginal itching or discharge, although she has not had that with her previous infections. She also denies fevers, night sweats/chills, or a recent cold, or flank tenderness.  Her LMP was around 11/28 and remain regular.  She is not utilizing contraception.  She explained that with the last few doses of diflucan her urinary symptoms would go away for about 2-3 days before returning. She has never been sexually active.     Patient also explained that she recently resumed milk in her diet and so, she now endorses abdominal discomfort, diarrhea, and loose stools.  She had been told in the past to avoid dairy with her IBS.     Problem, Medication and Allergy Lists were reviewed and updated if needed..    Patient is an established patient of this clinic..         Review of Systems:   Review of Systems   Constitutional: Negative for chills and fever.   Gastrointestinal: Positive for abdominal pain and diarrhea. Negative for nausea and vomiting.   Genitourinary: Positive for frequency. Negative for flank pain, hematuria, menstrual problem, vaginal discharge and vaginal pain.   Musculoskeletal: Negative for arthralgias, back pain and myalgias.            Physical Exam:     Vitals:    12/06/19 1648 12/06/19 1659   BP: 134/73 123/81   BP Location: Left arm Left arm   Patient Position: Sitting Sitting   Cuff Size: Adult Regular Adult Regular   Pulse: 77    Temp: 97.9  F (36.6  C)     TempSrc: Oral    SpO2: 100%    Weight: 56.7 kg (125 lb)      Body mass index is 19.01 kg/m .  Vitals were reviewed and were normal     Physical Exam  Constitutional:       Appearance: Normal appearance.   Cardiovascular:      Rate and Rhythm: Normal rate and regular rhythm.   Pulmonary:      Effort: Pulmonary effort is normal.      Breath sounds: Normal breath sounds.   Abdominal:      General: Abdomen is flat. There is no distension.      Palpations: Abdomen is soft.      Tenderness: There is no abdominal tenderness.   Neurological:      General: No focal deficit present.      Mental Status: She is alert and oriented to person, place, and time.   Psychiatric:         Mood and Affect: Mood normal.           Results:      Results from this visit  Results for orders placed or performed in visit on 12/06/19   Urinalysis, Micro If (UA) ([facility])     Status: Abnormal   Result Value Ref Range    Specific Gravity Urine 1.020 1.005 - 1.030    pH Urine 8.0 4.5 - 8.0    Leukocyte Esterase UR Negative NEGATIVE    Nitrite Urine Negative NEGATIVE mg/dL    Protein UR Negative NEGATIVE mg/dL    Glucose Urine Negative NEGATIVE    Ketones Urine Negative NEGATIVE mg/dL    Urobilinogen mg/dL 0.2 E.U./dL 0.2 E.U./dL E.U./dL    Bilirubin UR Negative NEGATIVE mg/dL    Blood UR Trace-intact (A) NEGATIVE mg/dL   HCG Qualitative Urine (UPT) ([facility])     Status: Abnormal   Result Value Ref Range    HCG Qual Urine NEGATIVE (A) Negative   Urine Microscopic (UA) ([facility])     Status: None   Result Value Ref Range    WBC Urine None <5 /hpf    RBC Urine <2 <5 /hpf    Epithelial Cells UR 2-5 0 - 2 /lpf    Mucous Urine None NONE lpf    Casts Urine None NONE /lpf    Crystal Urine None NONE /lpf    Bacteria Wet Prep Few None   Wet Prep ([facility])     Status: None   Result Value Ref Range    Yeast Wet Prep None none    Motile Trichomonas Wet Prep Negative Negative    Clue Cells Wet Prep Present <20% NONE    WBC WET PREP <2 2 - 5    Bacteria Wet  Prep Few None    pH Wet Prep Not performed 3.8 - 4.5    Odor Wet Prep None NONE     Urine Culture pending       Assessment and Plan      Lissy Foote is a 24 year old female with PMH significant for multiple vaginal yeast infections who presents to clinic with recurrent dysuria.     Dysuria, Increased urinary frequency   Recurrent vulvovaginal candidiasis  At the time the visit was over, the UA was unrevealing for a UTI and with her symptoms similar to her prior yeast infections we dicussed trying a stronger dose of fluconazole. If the yeast infection persisted after this trial, we would want her to return to clinic for possible yeast culture, a different trial of antifungals, and a possible diabetes workup since she's had vaginal yeast infections twice before this visit. Will still treat with the 3 day empiric diflucan today and follow up regarding symptoms. If symptoms still persist, would test for gonorrhea/chlamydia, DNA probe for yeast, consider non-infectious cystitis.  - Urinalysis, Micro If (UA) ([facility])  - Urine Microscopic (UA) ([facility])  - Urine Culture Aerobic Bacterial  - Wet Prep ([facility])  - HCG Qualitative Urine (UPT) ([facility])  - fluconazole (DIFLUCAN) 150 MG tablet; Take 1 tablet (150 mg) by mouth daily for 3 days  Dispense: 3 tablet; Refill: 0       Medications Discontinued During This Encounter   Medication Reason     fluconazole (DIFLUCAN) 150 MG tablet      fluconazole (DIFLUCAN) 150 MG tablet      fluconazole (DIFLUCAN) 150 MG tablet        Options for treatment and follow-up care were reviewed with the patient. Lissy Foote  engaged in the decision making process and verbalized understanding of the options discussed and agreed with the final plan.    Fatimah Cervantes, MS3    Resident Attestation:  I was present with the medical student who participated in the service and in the documentation of this note. I have verified the history and personally performed the physical exam and medical decision  making. I have verified the content of the note, which accurately reflects my assessment of the patient and the plan of care.  Resident Physician: Iglesia Dominguez, DO

## 2019-12-06 NOTE — PATIENT INSTRUCTIONS
Here is the plan from today's visit      We're going to treat as if it's a stubborn yeast infection and do 3 days of the yeast pill  Labs by [employer]  If this doesn't work, come back and we'll do a fancy yeast test and do a totally different yeast treatment    1. Dysuria    - Urinalysis, Micro If (UA) ([facility])  - Urine Microscopic (UA) ([facility])  - Urine Culture Aerobic Bacterial  - Wet Prep ([facility])    2. Screening for condition    - HCG Qualitative Urine (UPT) ([facility])    3. Vulvovaginal candidiasis    - fluconazole (DIFLUCAN) 150 MG tablet; Take 1 tablet (150 mg) by mouth daily for 3 days  Dispense: 3 tablet; Refill: 0    Please call or return to clinic if your symptoms don't go away.    Follow up plan  Please make a clinic appointment for follow up with me (DARNELL PARISH) in 1-2  weeks for follow up if not improving.    Thank you for coming to [facility] today.  Lab Testing:  **If you had lab testing today and your results are reassuring or normal they will be mailed to you or sent through [employer] within 7 days.   **If the lab tests need quick action we will call you with the results.  The phone number we will call with results is # [phone] (home) . If this is not the best number please call our clinic and change the number.  Medication Refills:  If you need any refills please call your pharmacy and they will contact us.   If you need to  your refill at a new pharmacy, please contact the new pharmacy directly. The new pharmacy will help you get your medications transferred faster.   Scheduling:  If you have any concerns about today's visit or wish to schedule another appointment please call our office during normal business hours [phone] (8-5:00 M-F)  If a referral was made to a [facility] and you don't get a call from central scheduling please call [phone].  If a Mammogram was ordered for you at [facility] call [phone] to schedule or  change your appointment.  If you had an XRay/CT/Ultrasound/MRI ordered the number is [phone] to schedule or change your radiology appointment.   Medical Concerns:  If you have urgent medical concerns please call [phone] at any time of the day.    Iglesia Dominguez, DO

## 2019-12-08 LAB
BACTERIA SPEC CULT: NO GROWTH
Lab: NORMAL
SPECIMEN SOURCE: NORMAL

## 2019-12-16 PROBLEM — Z86.19 HISTORY OF HELICOBACTER PYLORI INFECTION: Status: ACTIVE | Noted: 2019-12-16

## 2019-12-16 PROBLEM — R80.0 ISOLATED PROTEINURIA: Status: ACTIVE | Noted: 2019-12-16

## 2020-03-10 ENCOUNTER — HEALTH MAINTENANCE LETTER (OUTPATIENT)
Age: 25
End: 2020-03-10

## 2020-12-27 ENCOUNTER — HEALTH MAINTENANCE LETTER (OUTPATIENT)
Age: 25
End: 2020-12-27

## 2021-03-22 ENCOUNTER — ANCILLARY PROCEDURE (OUTPATIENT)
Dept: GENERAL RADIOLOGY | Facility: CLINIC | Age: 26
End: 2021-03-22
Attending: FAMILY MEDICINE
Payer: COMMERCIAL

## 2021-03-22 ENCOUNTER — OFFICE VISIT (OUTPATIENT)
Dept: FAMILY MEDICINE | Facility: CLINIC | Age: 26
End: 2021-03-22
Payer: COMMERCIAL

## 2021-03-22 VITALS
RESPIRATION RATE: 16 BRPM | TEMPERATURE: 98 F | BODY MASS INDEX: 19.16 KG/M2 | SYSTOLIC BLOOD PRESSURE: 106 MMHG | WEIGHT: 126 LBS | HEART RATE: 67 BPM | OXYGEN SATURATION: 99 % | DIASTOLIC BLOOD PRESSURE: 69 MMHG

## 2021-03-22 DIAGNOSIS — R82.90 ABNORMAL URINE: Primary | ICD-10-CM

## 2021-03-22 DIAGNOSIS — M25.562 ACUTE PAIN OF LEFT KNEE: ICD-10-CM

## 2021-03-22 PROBLEM — R80.0 ISOLATED PROTEINURIA: Status: RESOLVED | Noted: 2019-12-16 | Resolved: 2021-03-22

## 2021-03-22 PROBLEM — B37.31 CANDIDIASIS OF VAGINA: Status: RESOLVED | Noted: 2019-10-31 | Resolved: 2021-03-22

## 2021-03-22 LAB
BACTERIA: NORMAL
BILIRUBIN UR: NEGATIVE MG/DL
BLOOD UR: ABNORMAL MG/DL
CASTS: NORMAL /LPF
CRYSTAL URINE: NORMAL /LPF
EPITHELIAL CELLS UR: NORMAL /LPF (ref 0–2)
GLUCOSE URINE: NEGATIVE
KETONES UR QL: NEGATIVE MG/DL
LEUKOCYTE ESTERASE UR: NEGATIVE
MUCOUS URINE: NORMAL LPF
NITRITE UR QL STRIP: NEGATIVE MG/DL
PH UR STRIP: 6.5 [PH] (ref 4.5–8)
PROTEIN UR: NEGATIVE MG/DL
RBC URINE: NORMAL /HPF
SP GR UR STRIP: >=1.03 (ref 1–1.03)
UROBILINOGEN UR STRIP-ACNC: ABNORMAL E.U./DL
WBC URINE: NORMAL /HPF

## 2021-03-22 PROCEDURE — 73562 X-RAY EXAM OF KNEE 3: CPT | Mod: LT | Performed by: RADIOLOGY

## 2021-03-22 PROCEDURE — 99214 OFFICE O/P EST MOD 30 MIN: CPT | Performed by: FAMILY MEDICINE

## 2021-03-22 PROCEDURE — 81001 URINALYSIS AUTO W/SCOPE: CPT | Performed by: FAMILY MEDICINE

## 2021-03-22 NOTE — PATIENT INSTRUCTIONS
Feet  Daily: Soak feet for 10-15 minutes in warm water, dry, then use Pumice stone on hard areas.   If not improving after 2 weeks, return to clinic    Knee  Nhan Starks MD [phone]  Physical Therapy and follow up with [facility] if no improvement.     Urine  No sign of protein or other problems in the urine. Eat balanced diet, drink lots of water. If pain or itching, return to clinic.

## 2021-03-22 NOTE — PROGRESS NOTES
Assessment & Plan   Feet  Likely calus, doesn't look like plantar warts - no center  - Daily: Soak feet for 10-15 minutes in warm water, dry, then use Pumice stone on hard areas.   - If not improving after 2 weeks, return to clinic    Acute pain of left knee  H/o varus deformity and a brace, never had surgery (Nhan Starks MD [phone]). Xray normal.   - XR Knee Left 3 Views; Future  - PHYSICAL THERAPY REFERRAL - INTERNAL; Future  - [facility] INTERNAL REFERRAL  Physical Therapy and follow up with [facility] if no improvement.         Urine  No sign of protein or other problems in the urine. Eat balanced diet, drink lots of water. If pain or itching, return to clinic.   Abnormal urine  - Urinalysis, Micro If (UA) ([facility])  - Urine Microscopic (UA) ([facility])        Review of external notes as documented elsewhere in note  35 minutes spent on the date of the encounter doing chart review, history and exam, documentation and further activities as noted above    Bria Kingsley MD  [facility] GURU Yuan is a 25 year old who presents for the following health issues   HPI     Left Knee Pain  Exercising several weeks ago, while doing a squat felt sharp pain in left knee, stopped exercising. Pain improved after 3 days, sat on leg and pain returned and has persisted. Pain slightly better today. Pain initially deep, but now spreading to other parts of the leg and a sore spot on knee. No swelling.    Has tried ice. Walks with a limp, favoring other leg. Pillow between knees made it better one nice. Flexion is the most painful. Sore spot = medial    Had a left leg brace when younger, straightened crooked leg, around age 8 ([facility] [place]). No surgeries.  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 Justin  MD Tere[facility] 5/29/2009 - no concerns.     Urination  2-3 weeks of  Notices white particles floating in urine. No pain, frequency, or urgency. No change in vaginal discharge, no vaginal itching.        Bumps on feet  Hurt initially, no pain but bumps remain. Uses an exfoliation cloth but no pumice stone or soaking.   1 week before that had finger bumps that did go away.         Review of Systems       Objective    /69   Pulse 67   Temp 98  F (36.7  C) (Oral)   Resp 16   Wt 57.2 kg (126 lb)   LMP 03/14/2021 (Approximate)   SpO2 99%   BMI 19.16 kg/m    Body mass index is 19.16 kg/m .  Physical Exam  Musculoskeletal:      Left knee: She exhibits bony tenderness. She exhibits normal range of motion, no swelling, no erythema, normal alignment, no LCL laxity, normal meniscus and no MCL laxity. Tenderness found. Medial joint line tenderness noted. No lateral joint line, no MCL and no LCL tenderness noted.        Feet:

## 2021-03-23 NOTE — RESULT ENCOUNTER NOTE
The results from your recent testing are normal. Plan to do physical therapy, and follow up with Sports Medicine at [facility] if not improving after 4 weeks.  If you have any further questions feel free to contact me via [employer] message.     Sincerely,   Bria Kingsley MD

## 2021-03-23 NOTE — RESULT ENCOUNTER NOTE
The results from your recent testing are normal. If you have any further questions feel free to contact me via [employer] message.     Sincerely,   Bria Kingsley MD

## 2021-03-25 ENCOUNTER — THERAPY VISIT (OUTPATIENT)
Dept: PHYSICAL THERAPY | Facility: CLINIC | Age: 26
End: 2021-03-25
Attending: FAMILY MEDICINE
Payer: COMMERCIAL

## 2021-03-25 DIAGNOSIS — M25.562 ACUTE PAIN OF LEFT KNEE: ICD-10-CM

## 2021-03-25 PROCEDURE — 97161 PT EVAL LOW COMPLEX 20 MIN: CPT | Mod: GP | Performed by: PHYSICAL THERAPIST

## 2021-03-25 PROCEDURE — 97112 NEUROMUSCULAR REEDUCATION: CPT | Mod: GP | Performed by: PHYSICAL THERAPIST

## 2021-03-25 NOTE — PROGRESS NOTES
Physical Therapy Initial Evaluation  Subjective:  The history is provided by the patient. No  was used.   Therapist Generated HPI Evaluation  Problem details: Pain level 2-3/10  (February 2021).         Type of problem:  Left knee.    This is a new condition.  Occurance: squats/lunges.  Where condition occurred: at home.  Patient reports pain:  Anterior (medial patella).  Pain is described as aching and cramping and is intermittent.  Radiates to: lateral upper and lower leg.   Since onset symptoms are gradually improving.  Associated with: none. Exacerbated by: bending, praying,   and relieved by rest.  Special tests included:  X-ray (unremarkable).  Past treatment: na. Improved with treatment: na.  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  Barriers include:  None as reported by patient.                        Objective:  System                                                Knee Evaluation:  ROM:    AROM      Extension:  Left: 0    Right:  0  Flexion: Left: 142 with stiffness    Right: 148        Strength:     Extension:  Left: 5-/5   Pain:      Right: 5/5   Pain:  Flexion:  Left: 4+/5   Pain:      Right: 5/5   Pain:    Quad Set Left: Poor    Pain:   Quad Set Right: Good    Pain:  Ligament Testing:  Not Assessed                          mmt: gluteus medius: 3+/5 (B) single leg balance: R: 30, L: 20 with unstable left foot, left vmo insertion tenderness with palpation    General     ROS    Assessment/Plan:    Patient is a 26 year old female with left side knee complaints.    Patient has the following significant findings with corresponding treatment plan.                Diagnosis 1:  Acute pain of left knee   Pain -  hot/cold therapy, manual therapy, self management, education, directional preference exercise and home program  Decreased ROM/flexibility - manual therapy, therapeutic exercise, therapeutic activity and home program  Decreased  strength - therapeutic exercise, therapeutic activities and home program  Impaired muscle performance - neuro re-education and home program  Decreased function - therapeutic activities and home program    Therapy Evaluation Codes:   1)  Clinical presentation characteristics are:   Stable/Uncomplicated.  2) Decision-Making    Low complexity using standardized patient assessment instrument and/or measureable assessment of functional outcome.  Cumulative Therapy Evaluation is: Low complexity.    Previous and current functional limitations:  (See Goal Flow Sheet for this information)    Short term and Long term goals: (See Goal Flow Sheet for this information)     Communication ability:  Patient appears to be able to clearly communicate and understand verbal and written communication and follow directions correctly.  Treatment Explanation - The following has been discussed with the patient:   RX ordered/plan of care  Anticipated outcomes  Possible risks and side effects  This patient would benefit from PT intervention to resume normal activities.   Rehab potential is good.    Frequency:  1 X week, once daily  Duration:  for 8 weeks  Discharge Plan:  Achieve all LTG.  Independent in home treatment program.  Reach maximal therapeutic benefit.    Please refer to the daily flowsheet for treatment today, total treatment time and time spent performing 1:1 timed codes.

## 2021-03-26 ASSESSMENT — ACTIVITIES OF DAILY LIVING (ADL)
GIVING WAY, BUCKLING OR SHIFTING OF KNEE: NOT ANSWERED
LIMPING: NOT ANSWERED
RISE FROM A CHAIR: ACTIVITY IS NOT DIFFICULT
SWELLING: NOT ANSWERED
PAIN: THE SYMPTOM AFFECTS MY ACTIVITY SLIGHTLY
GO UP STAIRS: ACTIVITY IS MINIMALLY DIFFICULT
STAND: ACTIVITY IS MINIMALLY DIFFICULT
KNEEL ON THE FRONT OF YOUR KNEE: ACTIVITY IS FAIRLY DIFFICULT
SIT WITH YOUR KNEE BENT: I AM UNABLE TO DO THE ACTIVITY
HOW_WOULD_YOU_RATE_THE_OVERALL_FUNCTION_OF_YOUR_KNEE_DURING_YOUR_USUAL_DAILY_ACTIVITIES?: NEARLY NORMAL
HOW_WOULD_YOU_RATE_THE_CURRENT_FUNCTION_OF_YOUR_KNEE_DURING_YOUR_USUAL_DAILY_ACTIVITIES_ON_A_SCALE_FROM_0_TO_100_WITH_100_BEING_YOUR_LEVEL_OF_KNEE_FUNCTION_PRIOR_TO_YOUR_INJURY_AND_0_BEING_THE_INABILITY_TO_PERFORM_ANY_OF_YOUR_USUAL_DAILY_ACTIVITIES?: 65
STIFFNESS: THE SYMPTOM AFFECTS MY ACTIVITY SLIGHTLY
WEAKNESS: THE SYMPTOM AFFECTS MY ACTIVITY SLIGHTLY
KNEE_ACTIVITY_OF_DAILY_LIVING_SUM: 35
AS_A_RESULT_OF_YOUR_KNEE_INJURY,_HOW_WOULD_YOU_RATE_YOUR_CURRENT_LEVEL_OF_DAILY_ACTIVITY?: NEARLY NORMAL
WALK: ACTIVITY IS MINIMALLY DIFFICULT
GO DOWN STAIRS: ACTIVITY IS NOT DIFFICULT
SQUAT: ACTIVITY IS FAIRLY DIFFICULT

## 2021-03-26 NOTE — PROGRESS NOTES
Physical Therapy Initial Evaluation  Subjective:    Patient Health History         Pain is reported as 2/10 on pain scale.  General health as reported by patient is good.              Current medications: Iron, Vitamin D.    Current occupation is Teacher.   Primary job tasks include:  Lifting/carrying, driving and prolonged sitting.                                    Objective:  System    Physical Exam    General     ROS    Assessment/Plan:

## 2021-03-26 NOTE — PROGRESS NOTES
Physical Therapy Initial Evaluation  Subjective:    Patient Health History         Pain is reported as 2/10 on pain scale.  General health as reported by patient is good.                  Current occupation is Teacher.   Primary job tasks include:  Driving, lifting/carrying and prolonged sitting.                                    Objective:  System    Physical Exam    General     ROS    Assessment/Plan:

## 2021-04-24 ENCOUNTER — HEALTH MAINTENANCE LETTER (OUTPATIENT)
Age: 26
End: 2021-04-24

## 2021-08-16 NOTE — PROGRESS NOTES
"    Assessment & Plan     Patient is a pleasant 26 year old female who presents today for multiple complaints.     Bunion, left  Bunion, right  MTP tenderness bilateral 5th digits, ongoing for >7 months. No change since that time. Does have some thickened tissue lateral to 5th MTP as well, likely early bunions. Encouraged patient to utilize shoe inserts, however, a referral to podiatry is placed today.   - Orthopedic  Referral    Iron deficiency anemia secondary to inadequate dietary iron intake  Patient has a history of iron deficiency. Has been taking oral supplementation. Does report history of fatigue, so will recheck cbc and BMP today  - CBC with platelets  - Basic metabolic panel    Vitamin D deficiency  History of vit d def, on oral supplementation, due for recheck particularly in context of increased fatigue.  - Vitamin D deficiency screening  - Basic metabolic panel    Fatigue, unspecified type  Strong family history of hypothyroidism. Has had increasing fatigue as of late. Will check a TSH, vit d, CBC, and bmp today to assess for possible contributing factors.  - TSH with free T4 reflex  - Vitamin D deficiency screening  - CBC with platelets  - Basic metabolic panel    Ragged cuticle  Patient is noting she has had problems with \"peeling\" of the cuticles of fingers bilaterally. No other peeling skin in other areas of body. No signs of infection. No tenderness. Reports she does not chew her nails or cuticles, has tried moisturizing. No clear etiology at this time. However, will trial a multivitamin OTC to see if overall improved nutrition would help with this as well. If this becomes bothersome and not improved over time, plan referral to dermatology.      No follow-ups on file.    Amanda Soto MD  [facility] GURU Yuan is a 26 year old who presents for the following health issues   Chief Complaint   Patient presents with     RECHECK     x since feb 2021, pt " "infroms to have sores on both her feet and was informed to use pumice stones and none has worked made you feet feel worse     Derm Problem     skin peeling on her fingers and usually the skin around her cuticles     RECHECK     check on her health and requested for blood work to check on her iron and vitamin levels        HPI   history of iron deficiency and vitamin d deficiency. Wondering about where her levels are at.     Notes some fatigue    Also noting peeling skin at the cuticles on bilateral hands. Does not chew these areas, has tried moisturizing and decreased hand washing/soaking hands.       Review of Systems   Constitutional, HEENT, cardiovascular, pulmonary, gi and gu systems are negative, except as otherwise noted.        Objective    /75   Pulse 65   Temp 98.2  F (36.8  C) (Oral)   Ht 1.727 m (5' 8\")   Wt 56.1 kg (123 lb 9.6 oz)   LMP 08/16/2021 (Exact Date)   SpO2 100%   Breastfeeding No   BMI 18.79 kg/m    Body mass index is 18.79 kg/m .  Physical Exam  Constitutional:       Appearance: Normal appearance.   HENT:      Head: Normocephalic.   Eyes:      General: No scleral icterus.     Extraocular Movements: Extraocular movements intact.      Conjunctiva/sclera: Conjunctivae normal.   Cardiovascular:      Rate and Rhythm: Normal rate.   Pulmonary:      Effort: Pulmonary effort is normal.   Musculoskeletal:         General: Normal range of motion.      Cervical back: Normal range of motion.        Feet:    Feet:      Comments: Bilateral areas of tenderness at plantar MTP joint 5th digits. Increased thickening of tissue just lateral to 5th digits.  Skin:     Comments: Linear pealing of skin proximally from cuticles bilateral hands. No other dry skin or pealing noted over the rest of the visualized body.    Neurological:      General: No focal deficit present.      Mental Status: She is alert and oriented to person, place, and time.         Results from this visitNo results found for any " visits on 08/17/21.    This office note has been dictated.

## 2021-08-17 ENCOUNTER — OFFICE VISIT (OUTPATIENT)
Dept: FAMILY MEDICINE | Facility: CLINIC | Age: 26
End: 2021-08-17
Payer: COMMERCIAL

## 2021-08-17 VITALS
BODY MASS INDEX: 18.73 KG/M2 | HEIGHT: 68 IN | WEIGHT: 123.6 LBS | OXYGEN SATURATION: 100 % | TEMPERATURE: 98.2 F | HEART RATE: 65 BPM | SYSTOLIC BLOOD PRESSURE: 113 MMHG | DIASTOLIC BLOOD PRESSURE: 75 MMHG

## 2021-08-17 DIAGNOSIS — D50.8 IRON DEFICIENCY ANEMIA SECONDARY TO INADEQUATE DIETARY IRON INTAKE: ICD-10-CM

## 2021-08-17 DIAGNOSIS — R53.83 FATIGUE, UNSPECIFIED TYPE: ICD-10-CM

## 2021-08-17 DIAGNOSIS — L60.8 RAGGED CUTICLE: ICD-10-CM

## 2021-08-17 DIAGNOSIS — E55.9 VITAMIN D DEFICIENCY: ICD-10-CM

## 2021-08-17 DIAGNOSIS — M21.612 BUNION, LEFT: Primary | ICD-10-CM

## 2021-08-17 DIAGNOSIS — M21.611 BUNION, RIGHT: ICD-10-CM

## 2021-08-17 LAB
ANION GAP SERPL CALCULATED.3IONS-SCNC: 7 MMOL/L (ref 3–14)
BUN SERPL-MCNC: 10 MG/DL (ref 7–30)
CALCIUM SERPL-MCNC: 8.8 MG/DL (ref 8.5–10.1)
CHLORIDE BLD-SCNC: 107 MMOL/L (ref 94–109)
CO2 SERPL-SCNC: 26 MMOL/L (ref 20–32)
CREAT SERPL-MCNC: 0.69 MG/DL (ref 0.52–1.04)
GFR SERPL CREATININE-BSD FRML MDRD: >90 ML/MIN/1.73M2
GLUCOSE BLD-MCNC: 72 MG/DL (ref 70–99)
POTASSIUM BLD-SCNC: 4.4 MMOL/L (ref 3.4–5.3)
SODIUM SERPL-SCNC: 140 MMOL/L (ref 133–144)
TSH SERPL DL<=0.005 MIU/L-ACNC: 2.9 MU/L (ref 0.4–4)

## 2021-08-17 PROCEDURE — 80048 BASIC METABOLIC PNL TOTAL CA: CPT | Performed by: STUDENT IN AN ORGANIZED HEALTH CARE EDUCATION/TRAINING PROGRAM

## 2021-08-17 PROCEDURE — 84443 ASSAY THYROID STIM HORMONE: CPT | Performed by: STUDENT IN AN ORGANIZED HEALTH CARE EDUCATION/TRAINING PROGRAM

## 2021-08-17 PROCEDURE — 82306 VITAMIN D 25 HYDROXY: CPT | Performed by: STUDENT IN AN ORGANIZED HEALTH CARE EDUCATION/TRAINING PROGRAM

## 2021-08-17 PROCEDURE — 99214 OFFICE O/P EST MOD 30 MIN: CPT | Mod: GC | Performed by: STUDENT IN AN ORGANIZED HEALTH CARE EDUCATION/TRAINING PROGRAM

## 2021-08-17 PROCEDURE — 36415 COLL VENOUS BLD VENIPUNCTURE: CPT | Performed by: STUDENT IN AN ORGANIZED HEALTH CARE EDUCATION/TRAINING PROGRAM

## 2021-08-17 ASSESSMENT — MIFFLIN-ST. JEOR: SCORE: 1359.15

## 2021-08-17 NOTE — PROGRESS NOTES
Preceptor Attestation:    I discussed the patient with the resident and evaluated the patient in person. I have verified the content of the note, which accurately reflects my assessment of the patient and the plan of care.   Supervising Physician:  Oneal Solorio MD.

## 2021-08-17 NOTE — PATIENT INSTRUCTIONS
Patient Education   Here is the plan from today's visit    1. Bunion, left  - Orthopedic  Referral; Future    2. Bunion, right  - Orthopedic  Referral; Future    3. Iron deficiency anemia secondary to inadequate dietary iron intake  - CBC with platelets; Future  - Basic metabolic panel; Future  - CBC with platelets  - Basic metabolic panel    4. Vitamin D deficiency  - Vitamin D deficiency screening; Future  - Basic metabolic panel; Future  - Vitamin D deficiency screening  - Basic metabolic panel    5. Fatigue, unspecified type  - TSH with free T4 reflex; Future  - Vitamin D deficiency screening; Future  - CBC with platelets; Future  - Basic metabolic panel; Future  - TSH with free T4 reflex  - Vitamin D deficiency screening  - CBC with platelets  - Basic metabolic panel      Please call or return to clinic if your symptoms don't go away.    Follow up plan  No follow-ups on file.    Thank you for coming to [facility] today.  COVID-19 Vaccine:  Starting Monday 8/2/21: [facility] will have walk-in appointments for Moderna, Pfizer and Bucky&Bucky vaccines. No appointment needed! You will also have the option of receiving Moderna vaccine during your physician appointment. Please ask your care team for more information!  You may be eligible for a $100 Visa giftcard if you receive your first dose between Friday July 30th and Sunday August 22nd. Visit https://[place].gov/covid19/100/ for more information.   Lab Testing:  **If you had lab testing today and your results are reassuring or normal they will be mailed to you or sent through [employer] within 7 days.   **If the lab tests need quick action we will call you with the results.  **If you are having labs done on a different day, please call [phone] to schedule at [facility] or [phone] for other [facility] locations.   The phone number we will call with results is # [phone] (home) . If this is not the  best number please call our clinic and change the number.  Medication Refills:  If you need any refills please call your pharmacy and they will contact us.   If you need to  your refill at a new pharmacy, please contact the new pharmacy directly. The new pharmacy will help you get your medications transferred faster.   Scheduling:  If you have any concerns about today's visit or wish to schedule another appointment please call our office during normal business hours [phone] (8-5:00 M-F)  If a referral was made to a [facility] and you don't get a call from central scheduling please call [phone].  If a Mammogram was ordered for you at [facility] call [phone] to schedule or change your appointment.  If you had an EKG/XRay/CT/Ultrasound/MRI ordered the number is [phone] to schedule or change your radiology appointment.   Medical Concerns:  If you have urgent medical concerns please call [phone] at any time of the day.    Amanda Soto MD

## 2021-08-18 LAB — DEPRECATED CALCIDIOL+CALCIFEROL SERPL-MC: 55 UG/L (ref 20–75)

## 2021-08-20 LAB
ERYTHROCYTE [DISTWIDTH] IN BLOOD BY AUTOMATED COUNT: 11.9 % (ref 10–15)
HCT VFR BLD AUTO: 44.2 %
HGB BLD-MCNC: 14.5 G/DL
MCH RBC QN AUTO: 30 PG (ref 26.5–33)
MCHC RBC AUTO-ENTMCNC: 32.8 G/DL (ref 31.5–36.5)
MCV RBC AUTO: 91 FL (ref 78–100)
PLATELET # BLD AUTO: 123 10E3/UL (ref 150–450)
RBC # BLD AUTO: 4.84 10E6/UL
WBC # BLD AUTO: 3.7 10E3/UL (ref 4–11)

## 2021-08-20 PROCEDURE — 36415 COLL VENOUS BLD VENIPUNCTURE: CPT | Performed by: STUDENT IN AN ORGANIZED HEALTH CARE EDUCATION/TRAINING PROGRAM

## 2021-08-20 PROCEDURE — 85027 COMPLETE CBC AUTOMATED: CPT | Performed by: STUDENT IN AN ORGANIZED HEALTH CARE EDUCATION/TRAINING PROGRAM

## 2021-08-23 ENCOUNTER — ANCILLARY PROCEDURE (OUTPATIENT)
Dept: GENERAL RADIOLOGY | Facility: CLINIC | Age: 26
End: 2021-08-23
Attending: PODIATRIST
Payer: COMMERCIAL

## 2021-08-23 ENCOUNTER — OFFICE VISIT (OUTPATIENT)
Dept: PODIATRY | Facility: CLINIC | Age: 26
End: 2021-08-23
Attending: FAMILY MEDICINE
Payer: COMMERCIAL

## 2021-08-23 ENCOUNTER — MYC MEDICAL ADVICE (OUTPATIENT)
Dept: FAMILY MEDICINE | Facility: CLINIC | Age: 26
End: 2021-08-23

## 2021-08-23 VITALS
DIASTOLIC BLOOD PRESSURE: 68 MMHG | BODY MASS INDEX: 18.73 KG/M2 | WEIGHT: 123.6 LBS | SYSTOLIC BLOOD PRESSURE: 106 MMHG | HEIGHT: 68 IN

## 2021-08-23 DIAGNOSIS — M21.611 BUNION, RIGHT: ICD-10-CM

## 2021-08-23 DIAGNOSIS — D72.819 LEUKOPENIA, UNSPECIFIED TYPE: Primary | ICD-10-CM

## 2021-08-23 DIAGNOSIS — M21.612 BUNION, LEFT: ICD-10-CM

## 2021-08-23 DIAGNOSIS — M71.071 ABSCESS OF BURSA OF RIGHT FOOT: ICD-10-CM

## 2021-08-23 DIAGNOSIS — D69.6 THROMBOCYTOPENIA (H): ICD-10-CM

## 2021-08-23 DIAGNOSIS — M71.072 ABSCESS OF BURSA, LEFT ANKLE AND FOOT: ICD-10-CM

## 2021-08-23 DIAGNOSIS — M71.072 ABSCESS OF BURSA, LEFT ANKLE AND FOOT: Primary | ICD-10-CM

## 2021-08-23 PROCEDURE — 73630 X-RAY EXAM OF FOOT: CPT | Mod: LT | Performed by: RADIOLOGY

## 2021-08-23 PROCEDURE — 99204 OFFICE O/P NEW MOD 45 MIN: CPT | Performed by: PODIATRIST

## 2021-08-23 RX ORDER — ERGOCALCIFEROL (VITAMIN D2) 50 MCG
2000 CAPSULE ORAL DAILY
COMMUNITY

## 2021-08-23 ASSESSMENT — MIFFLIN-ST. JEOR: SCORE: 1359.15

## 2021-08-23 NOTE — PROGRESS NOTES
"Foot & Ankle Surgery  August 23, 2021    CC: \" Pain in both feet, bunions (?),  Bruises\"    I was asked to see Lissy Foote regarding the chief complaint by:  Dr. REGINA Soto    HPI:  Pt is a 26 year old female who presents with above complaint.  6 to 7-month history of the above issue.  She would like to \"fix foot pain and bunions(?)\".  \"Tingling sensation at times, throbbing, pain when walking and sitting\".  Pain 6 out of 10 \"comes and goes, more often\".  Worse with \"sometimes walking\".  \"Tried massaging feet, wearing\" for treatment which has helped.  She noticed a bump on her right fifth metatarsophalangeal joint 7 months ago but now \"both feet hurt everywhere\".  \"I have had for a very long time\" lower back issues    ROS:   Pos for CC.  The patient denies current nausea, vomiting, chills, fevers, belly pain, calf pain, chest pain or SOB.  Complete remainder of ROS is otherwise neg.    VITALS:    Vitals:    08/23/21 0900   BP: 106/68   Weight: 56.1 kg (123 lb 9.6 oz)   Height: 1.727 m (5' 8\")       PMH:    Past Medical History:   Diagnosis Date     Isolated proteinuria 12/16/2019    UACR 200 on 11/11/2019        SXHX:  No previous surgeries     MEDS:    Current Outpatient Medications   Medication     vitamin D2 (ERGOCALCIFEROL) 54254 units (1250 mcg) capsule     Ferrous Gluconate (IRON 27 PO)     No current facility-administered medications for this visit.       ALL:   No Known Allergies    FMH:    Family History   Problem Relation Age of Onset     Thyroid Disease Mother      Diabetes Mother      Thyroid Disease Maternal Grandmother      Hypothyroidism Father        SocHx:    Social History     Socioeconomic History     Marital status: Single     Spouse name: Not on file     Number of children: Not on file     Years of education: Not on file     Highest education level: Not on file   Occupational History     Not on file   Tobacco Use     Smoking status: Never Smoker     Smokeless tobacco: Never Used   Substance and " Sexual Activity     Alcohol use: No     Drug use: No     Sexual activity: Never   Other Topics Concern     Not on file   Social History Narrative     Not on file     Social Determinants of Health     Financial Resource Strain:      Difficulty of Paying Living Expenses:    Food Insecurity:      Worried About Running Out of Food in the Last Year:      Ran Out of Food in the Last Year:    Transportation Needs:      Lack of Transportation (Medical):      Lack of Transportation (Non-Medical):    Physical Activity:      Days of Exercise per Week:      Minutes of Exercise per Session:    Stress:      Feeling of Stress :    Social Connections:      Frequency of Communication with Friends and Family:      Frequency of Social Gatherings with Friends and Family:      Attends Jew Services:      Active Member of Clubs or Organizations:      Attends Club or Organization Meetings:      Marital Status:    Intimate Partner Violence:      Fear of Current or Ex-Partner:      Emotionally Abused:      Physically Abused:      Sexually Abused:            EXAMINATION:  Gen:   No apparent distress  Neuro:   A&Ox3, no deficits  Psych:    Answering questions appropriately for age and situation with normal affect  Head:    NCAT  Eye:    Visual scanning without deficit  Ear:    Response to auditory stimuli wnl  Lung:    Non-labored breathing on RA noted  Abd:    NTND per patient report  Lymph:    Neg for pitting/non-pitting edema BLE  Vasc:    Pulses palpable, CFT minimally delayed  Neuro:    Light touch sensation intact to all sensory nerve distributions without paresthesias  Derm:    Neg for nodules, lesions or ulcerations  MSK:    Right > left fifth MPJ soft tissue mass/bursa without bony deformity.  Possible mass subsecond MPJ of the left foot.  Pain along central band of the plantar fascial bilateral at the midfoot.  No other heel, arch, forefoot pain noted.  Calf:    Neg for redness, swelling or tenderness    Assessment:  26 year  old female with mass near fifth MPJ bilateral; plantar fasciitis bilateral      Plan:  Discussed etiologies, anatomy and options  1.  Mass near fifth MPJ bilateral  -I personally reviewed and interpreted the patient's lower extremity history pertinent to today's visit, including imaging/labs, in preparation for initiating a treatment program.  -We discussed potential causes of the painful bump.  This does not appear to be a tailor's bunion as the bone is not prominent.  She has soft tissue fluctuance at the lateral and plantar lateral aspect of the fifth MPJ consistent with a bursa.  -Conservatively, we discussed comfortable shoes and RICE/NSAID versus Tylenol as needed based on pain  -We did discuss the possibility of aspiration.  -Surgically, we discussed excising the mass and sending off for histological evaluation.  Surgery scheduling handout was dispensed.  She will consider her options    Job duties; time off work - childcare caregiver; 2 weeks minimum  Smoking history - no  Vit D - n/a  Diabetic/A1c - neg  Hemoglobin - draw prior to surgery  Clot history - neg  Allergies to surgical implants/suture - neg  Allergies/issues with narcotics - neg    Procedure(s):  1.  Excision of mass bilateral foot  Consent: above  Diagnosis:  Painful mass  Equipment/Vendor: none    2.  Plantar fasciitis bilateral  -Regarding the heel pain, the Plantar Fasciitis handout was dispensed and discussed.  We talked about stretching, resting/activity modification, icing, NSAID/tylenol use as tolerated, inserts, supportive/comfortable shoes and minimizing shoeless walking.    -discussed Achilles, plantar fascial and hamstring stretches  -OTC insert information dispensed and discussed        Follow up: As needed or sooner with acute issues      Patient's medical history was reviewed today      Will Fall DPM FACFAS FACFAOM  Podiatric Foot & Ankle Surgeon  [facility]  [phone]    Disclaimer: This note consists  of symbols derived from keyboarding, dictation and/or voice recognition software. As a result, there may be errors in the script that have gone undetected. Please consider this when interpreting information found in this chart.

## 2021-08-23 NOTE — RESULT ENCOUNTER NOTE
Dear Lissy  Here are your labs. As you can see, the WBC (white blood cell) count is slightly low. Your platelet level is also low. I think it may be helpful to repeat this test in a way that would give us more details, including looking at the platelets under a microscope. Please let me know if you are okay with me ordering more labs to be drawn again.   Please message me if you have any concerns.  Amanda Soto MD

## 2021-08-23 NOTE — PATIENT INSTRUCTIONS
"Thank you for choosing [facility] Podiatry / Foot & Ankle Surgery!    DR CORREA'S CLINIC LOCATIONS  [facility]   [address] [facility] [address]   [place] [address] [place] [address] [phone]       SET UP SURGERY: [phone]    APPOINTMENTS: [phone]    BILLING QUESTIONS: [phone]    FAX NUMBER: [phone]      FOOT & ANKLE SURGERY PLANNING CHECKLIST  If you have decided to have surgery, follow these steps to get the procedure scheduled and to have the proper paperwork filled out. If you are unsure about surgery or would like to sit down and further discuss your issue and treatment options, please make an appointment.    1.  Pick the date that you would like to have surgery. Surgery is done on a Wednesday at [facility]. Keep in mind that you will likely need at least 2 weeks off after the procedure for proper rest and healing.    2.  Call the surgery scheduling line at [phone] to get the procedure scheduled. Our  will also help you make your pre-operative physical with a primary doctor, your surgery consult appointment with me and your one week follow up after surgery for your first dressing change.    3.  If our surgery scheduler does not make your surgery consultation appointment with me then call to schedule that. When making the appointment, say \"I need to make a 30 minute surgical consult appointment\".     4. Contact your employer to request time off from work if needed. If there is going to be FMLA used, please have them fax the forms to [phone] at least one week prior to surgery.    * If you have any post-operative questions regarding your procedure, call our triage team at the [facility] at [phone] (option 3)    PLANTAR FASCIITIS    Plantar fasciitis is often referred to as heel spurs or heel pain. Plantar fasciitis is a very common problem that affects people of all " foot shapes, age, weight and activity level. Pain may be in the arch or on the weight-bearing surface of the heel. The pain may come on without injury or identifiable cause. Pain is generally present when first getting out of bed in the morning or up from a seated break.     CAUSES  The plantar fascia is a dense fibrous band of tissue that stretches across the bottom surface of the foot. The fascia helps support the foot muscles and arch. Plantar fasciitis is thought to be caused by mechanical strain or overload. Frequent walking without shoes or wearing unsupportive shoes is thought to cause structural overload and ultimately inflammation of the plantar fascia. Some people have heel spurs that can be seen on x-ray. The heel spur is actually a minor component of plantar fascitis and is largely ignored.       SELF TREATMENT   The easiest solution is to stop walking around your home without shoes. Plantar fasciitis is largely a shoe problem. Shoes are either not being worn often enough or your current shoes are inadequate for your weight, foot structure or activity level. The majority of shoes on the market today are not sufficient to resist development of plantar fasciitis or to promote healing. Assume that your current shoes are inadequate and will need to be replaced. Even high quality shoes wear out with 6 months to one year of frequent use. Weight loss is another option. Losing ten pounds in the next two months may be enough to resolve the problem. Ice applied to the area of pain two to three times per day for ten minutes each session can be very helpful. This should continue until the problem resolves. Achilles tendon stretching is essential. Stretch multiple times daily to promote healing and to prevent recurrence in the future.     MEDICAL TREATMENT  Medical treatments often include custom arch supports, cortisone injections, physical therapy, splints to be worn in bed, prescription medications and surgery.  The home treatments listed above will be necessary regardless of these advanced medical treatments. Surgery is rarely needed but is very helpful in selected cases.     PROGNOSIS  Plantar fasciitis can last from one day to a lifetime. Some people get intermittent fascitis that is very short-lived. Others suffer daily for years. Excessive body weight, frequent bare foot walking, long hours on the feet, inadequate shoes, predisposing foot structures and excessive activity such as running are all potential issues that lead to chronic and/or recurring plantar fascitis. Having plantar fasciitis means that you are forever prone to this problem and will require modification of some of the above factors. Most people seek treatment within one to four months. Healing usually requires a similar one to four month time frame. Healing time is relative to the amount of effort spent treating the problem.   Plantar fasciitis is highly recurrent. Risk factors often continue, including return to bare foot walking, inadequate shoes, excessive body weight, excessive activities, etc. Your life style and foot structure may predispose you to recurrent plantar fasciitis. A daily prevention regimen can be very helpful. Ongoing use of shoe inserts, careful attention to appropriate shoes, daily Achilles stretching, etc. may prevent recurrence. Prompt attention at the earliest warning signs of heel pain can resolve the problem in as short as a few days.     EXERCISES    Stair Exercise: Step on the stairs with the ball of your foot and hold your position for at least 15 seconds, then slowly step down with the heels of your foot. You can do this daily and as often as you want.   Picking the Towel: Sit comfortably and then pick the towel up with your toes. You can use any object other than a towel as long as the material can be soft and you can pick it up with your toes.  Rolling the Bottle: Use a small ball or frozen water bottle and then roll it  around with your foot.   Flex the Toes: Sit comfortably and then flex your toes by pointing it towards the floor or towards your body. This will relax and flex your foot and exercise your plantar fascia, the calf, and the Achilles tendon. The inability of the foot to stretch often causes the bunching up of the plantar fascia area leading to the pain.  Calf/Achilles Stretching: Lay on you back and raise one foot, then point your toes towards the floor. See photo below:               Hold each stretch for 10 seconds. Stretch 10 times per set, three sets per day. Morning, afternoon and evening. If your heel pain is very severe in the morning, consider doing the first set of stretches before you get out of bed.    THERAPIES DISCUSSED:  1.  Supportive Shoes: minimizing barefoot ambulation helps to provide cushion, padding and support to the ligament that is inflamed. Socks, flip flops, flats and some slippers are not typically sufficient to provide support. Shoes should be worn even indoors  2.  Insert/Orthotics: ones with an arch support built in to them provide further stress relief for the ligament. See the information below on recommended inserts.  3. Icing: using a frozen water bottle or orange, and rolling it along the bottom of the arch/heel can help to alleviate discomfort, and can act as a tissue massage to the painful, inflamed ligament.  There is evidence that shows icing at least three times daily can be beneficial  4.  Antiinflammatory (NSAID): Ibuprofen, Aleve, as well as Tylenol can be used to help decrease symptoms and improve pain levels. If you have high blood pressure, heart disease, stomach or kidney problems, use antiinflammatories sparingly. Tylenol should not be used if you have liver problems.   5. Activity Modifications: if there are certain things that you do, whether it's going barefoot or certain shoes/activities, you should try to minimize those activities as much as possible until your  symptoms are sufficiently resolved. Certainly, some activities, such as running on the treadmill, are easier to take a break from versus others, such as work or chores at home. If there are certain activities that hurt your heel, and you keep doing those activities that hurt your heel, your heel will keep hurting.  **If these initial therapies are insufficient, we have our tier 2 therapies that can more aggressively work to improve your symptoms and get you back to the activities that you enjoy!    OVER THE COUNTER INSERTS    Most of these can be found at your local [employer], [employer], or online:    Simphatic   Sofsole Fit emoquo   Power Step   Walk-Fit ([employer])  *For heel pain* Arch Cradles  *For heel pain*       **A good high quality over the counter insert should cost around $40-$50      [employer] [address]  [phone]   [address]  [phone] [place]  [address] [place] [address]  [phone]   [address]  [phone]   [place]  [address] [facility]  [phone] [place]  [address] [place] [address] [place].  [phone]   [place]  [address]  [phone]   [place]  [address]  [phone] [place]  [address]  [phone]

## 2021-08-23 NOTE — Clinical Note
"    8/23/2021         RE: Lissy Foote  [address]  [facility] [address]        Dear Colleague,    Thank you for referring your patient, Lissy Foote, to the [facility] PODIATRY. Please see a copy of my visit note below.    Foot & Ankle Surgery  August 23, 2021    CC: \"***\"    I was asked to see Lissy Foote regarding the chief complaint by:  Dr. REGINA Soto    HPI:  Pt is a 26 year old female who presents with above complaint.  ***    ROS:   Pos for CC.  The patient denies current nausea, vomiting, chills, fevers, belly pain, calf pain, chest pain or SOB.  Complete remainder of ROS is otherwise neg.    VITALS:    Vitals:    08/23/21 0900   BP: 106/68   Weight: 56.1 kg (123 lb 9.6 oz)   Height: 1.727 m (5' 8\")       PMH:    Past Medical History:   Diagnosis Date     Isolated proteinuria 12/16/2019    UACR 200 on 11/11/2019        SXHX:  No previous surgeries     MEDS:    Current Outpatient Medications   Medication     vitamin D2 (ERGOCALCIFEROL) 67303 units (1250 mcg) capsule     Ferrous Gluconate (IRON 27 PO)     No current facility-administered medications for this visit.       ALL:   No Known Allergies    FMH:    Family History   Problem Relation Age of Onset     Thyroid Disease Mother      Diabetes Mother      Thyroid Disease Maternal Grandmother      Hypothyroidism Father        SocHx:    Social History     Socioeconomic History     Marital status: Single     Spouse name: Not on file     Number of children: Not on file     Years of education: Not on file     Highest education level: Not on file   Occupational History     Not on file   Tobacco Use     Smoking status: Never Smoker     Smokeless tobacco: Never Used   Substance and Sexual Activity     Alcohol use: No     Drug use: No     Sexual activity: Never   Other Topics Concern     Not on file   Social History Narrative     Not on file     Social Determinants of Health     Financial Resource Strain:      Difficulty of Paying Living " Expenses:    Food Insecurity:      Worried About Running Out of Food in the Last Year:      Ran Out of Food in the Last Year:    Transportation Needs:      Lack of Transportation (Medical):      Lack of Transportation (Non-Medical):    Physical Activity:      Days of Exercise per Week:      Minutes of Exercise per Session:    Stress:      Feeling of Stress :    Social Connections:      Frequency of Communication with Friends and Family:      Frequency of Social Gatherings with Friends and Family:      Attends Sabianist Services:      Active Member of Clubs or Organizations:      Attends Club or Organization Meetings:      Marital Status:    Intimate Partner Violence:      Fear of Current or Ex-Partner:      Emotionally Abused:      Physically Abused:      Sexually Abused:            EXAMINATION:  Gen:   No apparent distress  Neuro:   A&Ox3, no deficits  Psych:    Answering questions appropriately for age and situation with normal affect  Head:    NCAT  Eye:    Visual scanning without deficit  Ear:    Response to auditory stimuli wnl  Lung:    Non-labored breathing on RA noted  Abd:    NTND per patient report  Lymph:    Neg for pitting/non-pitting edema BLE  Vasc:    Pulses palpable, CFT minimally delayed  Neuro:    Light touch sensation intact to all sensory nerve distributions without paresthesias  Derm:    Neg for nodules, lesions or ulcerations  MSK:    ROM, strength wnl without limitation, no pain on palpation noted.  Calf:    Neg for redness, swelling or tenderness  ***    Imaging:  ***  Labs:  ***  Cultures:  ***    Assessment:  26 year old female with ***      Plan:  Discussed etiologies, anatomy and options  1.  ***  -I personally reviewed and interpreted the patient's lower extremity history pertinent to today's visit, including imaging/labs, in preparation for initiating a treatment program.  -***    Job duties; time off work - childcare caregiver; 2 weeks minimum  Smoking history - no  Vit D -  n/a  Diabetic/A1c - neg  Hemoglobin - draw prior to surgery  Clot history - neg  Allergies to surgical implants/suture - neg  Allergies/issues with narcotics - neg    Procedure(s):  1.  Excision of mass bilateral foot  Consent: above  Diagnosis:  Painful mass  Equipment/Vendor: none                  Follow up:  *** or sooner with acute issues      Patient's medical history was reviewed today      Will Fall DPM [facility] HillFA  Podiatric Foot & Ankle Surgeon  [facility]  [phone]    Disclaimer: This note consists of symbols derived from keyboarding, dictation and/or voice recognition software. As a result, there may be errors in the script that have gone undetected. Please consider this when interpreting information found in this chart.            Again, thank you for allowing me to participate in the care of your patient.        Sincerely,        Will Fall DPM, DPMANI

## 2021-08-24 NOTE — TELEPHONE ENCOUNTER
Follow up from recent visit, plan to obtain more labs due to low platelets and low WBCs.     Amanda Soto MD

## 2021-10-04 ENCOUNTER — HEALTH MAINTENANCE LETTER (OUTPATIENT)
Age: 26
End: 2021-10-04

## 2022-03-04 ENCOUNTER — OFFICE VISIT (OUTPATIENT)
Dept: FAMILY MEDICINE | Facility: CLINIC | Age: 27
End: 2022-03-04
Payer: COMMERCIAL

## 2022-03-04 VITALS
HEART RATE: 70 BPM | TEMPERATURE: 98 F | OXYGEN SATURATION: 100 % | DIASTOLIC BLOOD PRESSURE: 80 MMHG | BODY MASS INDEX: 18.91 KG/M2 | RESPIRATION RATE: 16 BRPM | WEIGHT: 124.4 LBS | SYSTOLIC BLOOD PRESSURE: 121 MMHG

## 2022-03-04 DIAGNOSIS — D69.6 THROMBOCYTOPENIA (H): ICD-10-CM

## 2022-03-04 DIAGNOSIS — R20.0 NUMBNESS AND TINGLING IN BOTH HANDS: ICD-10-CM

## 2022-03-04 DIAGNOSIS — R20.2 NUMBNESS AND TINGLING IN BOTH HANDS: ICD-10-CM

## 2022-03-04 DIAGNOSIS — R20.0 NUMBNESS AND TINGLING OF BOTH FEET: ICD-10-CM

## 2022-03-04 DIAGNOSIS — R20.2 NUMBNESS AND TINGLING OF BOTH FEET: ICD-10-CM

## 2022-03-04 DIAGNOSIS — M67.40 GANGLION CYST: Primary | ICD-10-CM

## 2022-03-04 PROCEDURE — 99214 OFFICE O/P EST MOD 30 MIN: CPT | Mod: GC | Performed by: STUDENT IN AN ORGANIZED HEALTH CARE EDUCATION/TRAINING PROGRAM

## 2022-03-04 RX ORDER — IBUPROFEN 600 MG/1
600 TABLET, FILM COATED ORAL EVERY 6 HOURS PRN
Qty: 21 TABLET | Refills: 0 | Status: SHIPPED | OUTPATIENT
Start: 2022-03-04

## 2022-03-07 NOTE — PROGRESS NOTES
ASSESSMENT/PLAN:   There are no Patient Instructions on file for this visit.      ICD-10-CM    1. Ganglion cyst  M67.40 ibuprofen (ADVIL/MOTRIN) 600 MG tablet   2. Numbness and tingling in both hands  R20.0 CRP inflammation    R20.2 Comprehensive metabolic panel     TSH with free T4 reflex   3. Numbness and tingling of both feet  R20.0 CRP inflammation    R20.2 Comprehensive metabolic panel     TSH with free T4 reflex   4. Thrombocytopenia (H)  D69.6 CRP inflammation     CBC with platelets       See discussions below.      SUBJECTIVE:   Lissy Foote is a 26 year old female who presents to clinic today for the following health issues:  1. Cyst on toe--patient states she has had increasing discomfort of left great to over the last couple of months.  This particularly hurts with exercise and walking.  Says it has grown somewhat.  Says she has been exercising more lately including running and weightlifting.  Has not tried anything for this.  Was recently seen in podiatry clinic for a possible bursitis on the same foot.  Was offered a surgical procedure and declined.  We talked about treatments for tendon nodules/ganglion cysts including conservative vs inject/aspiration vs surgery.  Patient would like conservative treatment with rest, ice, ibuprofen.  If that fails she will conact us for a sports med referal for possible injection/aspiration under ultrasound.   2. Numbness of hands and feet.  Patient states she has had numbness of her feet on and off for several years.  Recently though she has had some numbness of her hands.  Admits they seem cool and often pale or bluish.  These symptoms have only happened the last couple of months during winter.  We talked about Raynaud's and conservative treatement with gloves and warm socks.  If this doesn't help or if this continues after it gets warm, then we will do further work up.  We will also do some initial labs for neuropathy or increased inflammation (to somewhat rule out  "autoimmune disease).  Lab is closed so she will do these next week.  3. History of thrombocytopenia.  Unclear if this is functional or even spurious.  She has been told to recheck this so we will order a follow up CBC.  If platelets remain low, we may do a limited workup.      Problem list and histories reviewed & adjusted, as indicated.  Additional history: as documented    Patient Active Problem List   Diagnosis     Social anxiety disorder     Iron deficiency     Constipation     History of Helicobacter pylori infection     History reviewed. No pertinent surgical history.    Social History     Tobacco Use     Smoking status: Never Smoker     Smokeless tobacco: Never Used   Substance Use Topics     Alcohol use: No     Family History   Problem Relation Age of Onset     Thyroid Disease Mother      Diabetes Mother      Thyroid Disease Maternal Grandmother      Hypothyroidism Father      Tremor Father         Patient reports \"hereditary tremors\"         Current Outpatient Medications   Medication Sig Dispense Refill     Ferrous Gluconate (IRON 27 PO) Take 1 tablet by mouth daily Blood Builder       ibuprofen (ADVIL/MOTRIN) 600 MG tablet Take 1 tablet (600 mg) by mouth every 6 hours as needed for moderate pain 21 tablet 0     Vitamin D2 (Ergocalciferol) 50 MCG (2000 UT) CAPS Take 2,000 Units by mouth daily        No Known Allergies    Reviewed and updated as needed this visit by clinical staff   Tobacco  Allergies  Meds   Med Hx  Surg Hx  Fam Hx  Soc Hx      Reviewed and updated as needed this visit by Provider                  ROS:  Constitutional, HEENT, cardiovascular, pulmonary, gi and gu systems are negative, except as otherwise noted.    OBJECTIVE:     /80 (BP Location: Left arm, Patient Position: Sitting, Cuff Size: Adult Small)   Pulse 70   Temp 98  F (36.7  C) (Oral)   Resp 16   Wt 56.4 kg (124 lb 6.4 oz)   LMP 02/14/2022 (Within Weeks)   SpO2 100%   Breastfeeding No   BMI 18.91 kg/m    Body " mass index is 18.91 kg/m .  GENERAL: healthy, alert and no distress  MS: no gross musculoskeletal defects noted, with exception of small nodule found on extensor tendon of left great toe.  Nodule is about 5 mm.  Toes appear well perfused and patient has normal radial and dorsalis pedis pulses.    Diagnostic Test Results:  No results found for any visits on 03/04/22.        Jerrica Lopez MD  [facility]

## 2022-03-07 NOTE — PROGRESS NOTES
Preceptor Attestation:    I discussed the patient with the resident and evaluated the patient in person. I have verified the content of the note, which accurately reflects my assessment of the patient and the plan of care.   Supervising Physician:  Herbie Espinoza MD.

## 2022-03-11 DIAGNOSIS — M67.40 GANGLION CYST: Primary | ICD-10-CM

## 2022-04-15 ENCOUNTER — OFFICE VISIT (OUTPATIENT)
Dept: PODIATRY | Facility: CLINIC | Age: 27
End: 2022-04-15
Payer: COMMERCIAL

## 2022-04-15 VITALS
HEIGHT: 68 IN | BODY MASS INDEX: 18.79 KG/M2 | SYSTOLIC BLOOD PRESSURE: 104 MMHG | WEIGHT: 124 LBS | DIASTOLIC BLOOD PRESSURE: 70 MMHG

## 2022-04-15 DIAGNOSIS — M21.621 TAILOR'S BUNION OF BOTH FEET: ICD-10-CM

## 2022-04-15 DIAGNOSIS — M67.472 GANGLION CYST OF LEFT FOOT: ICD-10-CM

## 2022-04-15 DIAGNOSIS — M77.52 BURSITIS OF BOTH FEET: Primary | ICD-10-CM

## 2022-04-15 DIAGNOSIS — M21.622 TAILOR'S BUNION OF BOTH FEET: ICD-10-CM

## 2022-04-15 DIAGNOSIS — M77.51 BURSITIS OF BOTH FEET: Primary | ICD-10-CM

## 2022-04-15 PROCEDURE — 99213 OFFICE O/P EST LOW 20 MIN: CPT | Performed by: PODIATRIST

## 2022-04-15 NOTE — PATIENT INSTRUCTIONS
PATIENT INSTRUCTIONS - Podiatry / Foot & Ankle Surgery    Imaging  An advanced imaging study has been ordered for you today MRI  Please call radiology to schedule your study:      [facility] ([place] and [place] clinics and surgery centers): [phone]    [facility] (both [place] & [facility])  [facility] - [facility], including [facility]    North: [phone]  [facility], including [place], [place], [place], [place], and [place]    South: [phone]  [facility], including [place], [facility], and [facility] ([facility]): [phone]  [facility], including [place], [facility] and [place]          Referral to Sports Medicine - for Ultrasound guided injections of bursa        [place] CUSTOM FOOT ORTHOTICS LOCATIONS  [facility]  [address]  [place] [address]  Phone: [phone]  Fax: [phone] [facility]  [address]  [place] [address]  Phone: [phone]   Fax: [phone]   [facility] [place]  [address]  [place] [address]  Phone: [phone]  Fax: [phone] [facility]  [address]  [place] [address]  Phone: [phone]   Fax: [phone]   [facility]   [address]  [place] [address]  Phone: [phone]  Fax: [phone] * Please call any location listed to make an appointment for a casting/fitting. Your referral was sent to their central office and they will all have the order on file.

## 2022-04-15 NOTE — PROGRESS NOTES
"Assessment:      ICD-10-CM    1. Bursitis of both feet  M77.51 [facility] DME Orthotic; Foot Orthotics    M77.52 Orthopedic  Referral   2. Tailor's bunion of both feet  M21.621 [facility] DME Orthotic; Foot Orthotics    M21.622    3. Ganglion cyst of left foot  M67.472 MR Foot Left w/o Contrast        Plan:  Orders Placed This Encounter   Procedures     MR Foot Left w/o Contrast     Orthopedic  Referral     [facility]st[facility] DME Orthotic; Foot Orthotics       Discussed the etiology and treatment of the condition with the patient.  Imaging studies reviewed and discussed with the patient.  Discussed surgical and conservative options.  5th met burisitis b/l  Mild tailor's bunions / 5th met prominence    -Sports Med - U/S guided steroid injection bursas  -O/P - offload 5ht met b/l  -MRI L foot - EHl / 1st MPJ ganglion    Can excise / tailor's correction if not improved      Return:  No follow-ups on file.    Stephanie Logan DPM                Chief Complaint:     No chief complaint on file.     bilateral foot pain    HPI:  Lissy Foote is a 27 year old year old female who presents for evaluation of foot pain.    Pain location- 5th MPJs b/l  Plantar lateral bursas  Also lump over big toe on L  Lump was small, has gotten larger, rubs in shoes  She noticed when she started doing cardio more, everything is worse      Past Medical & Surgical History:  Past Medical History:   Diagnosis Date     Isolated proteinuria 12/16/2019    UACR 200 on 11/11/2019       No past surgical history on file.   Family History   Problem Relation Age of Onset     Thyroid Disease Mother      Diabetes Mother      Thyroid Disease Maternal Grandmother      Hypothyroidism Father      Tremor Father         Patient reports \"hereditary tremors\"        Social History:  ?  History   Smoking Status     Never Smoker   Smokeless Tobacco     Never Used     History   Drug Use No     Social History    " "Substance and Sexual Activity      Alcohol use: No      Allergies:  ?   No Known Allergies     Medications:    Current Outpatient Medications   Medication     Ferrous Gluconate (IRON 27 PO)     ibuprofen (ADVIL/MOTRIN) 600 MG tablet     Vitamin D2 (Ergocalciferol) 50 MCG (2000 UT) CAPS     No current facility-administered medications for this visit.       Physical Exam:  ?  Vitals:  /70   Ht 1.727 m (5' 8\")   Wt 56.2 kg (124 lb)   LMP 02/14/2022 (Within Weeks)   BMI 18.85 kg/m     General:  WD/WN, in NAD.  A&O x3.  Dermatologic:    Skin is intact, open lesions absent.   Skin texture, turgor is normal.  Vascular:  Pulses palpable bilateral.  Digital capillary refill time normal bilateral.  Skin temperature is normal bilateral.  Generalized edema- none bilateral.  Focal edema- mild focal bursas 5th MPJ bilateral.  Neurologic:    Gross sensation normal.  Gait and balance normal.  Musculoskeletal:  Maximal pain to palpation of 5th met bursas bilateral.  Plantarlateral bursas present.    moderate pain to palpation of thicekeing of EHL retinaculum, posisble ganlgion, left.    1st MPJ ROM full, pain free bilateral.    Muscle strength 5/5  foot and ankle bilateral.    Stance:  RCSP Valgus bilateral.  Clinical deformity: mild tailor's bunions, 5th digit in varus & faces sideways    Imaging:   x-ray independently reviewed and interpreted by myself today.  Weight-bearing views left foot dated 04/15/22, reveal mild tailor's bunions, 4/5 HATTIE ~8-9 deg but lateral condyles visible laterally & varus rotation of digit present.  Pes valgus.  1st MPJ normal L              "

## 2022-04-15 NOTE — LETTER
4/15/2022         RE: Lissy Foote  [address]  [facility] [address]        Dear Colleague,    Thank you for referring your patient, Lissy Foote, to the [facility]. Please see a copy of my visit note below.    Assessment:      ICD-10-CM    1. Bursitis of both feet  M77.51 [facility] DME Orthotic; Foot Orthotics    M77.52 Orthopedic  Referral   2. Tailor's bunion of both feet  M21.621 [facility] DME Orthotic; Foot Orthotics    M21.622    3. Ganglion cyst of left foot  M67.472 MR Foot Left w/o Contrast        Plan:  Orders Placed This Encounter   Procedures     MR Foot Left w/o Contrast     Orthopedic  Referral     Orthotics and Prosthetics DME Orthotic; Foot Orthotics       Discussed the etiology and treatment of the condition with the patient.  Imaging studies reviewed and discussed with the patient.  Discussed surgical and conservative options.  5th met burisitis b/l  Mild tailor's bunions / 5th met prominence    -Sports Med - U/S guided steroid injection bursas  -O/P - offload 5ht met b/l  -MRI L foot - EHl / 1st MPJ ganglion    Can excise / tailor's correction if not improved      Return:  No follow-ups on file.    Stephanie Logan DPM                Chief Complaint:     No chief complaint on file.     bilateral foot pain    HPI:  Lissy Foote is a 27 year old year old female who presents for evaluation of foot pain.    Pain location- 5th MPJs b/l  Plantar lateral bursas  Also lump over big toe on L  Lump was small, has gotten larger, rubs in shoes  She noticed when she started doing cardio more, everything is worse      Past Medical & Surgical History:  Past Medical History:   Diagnosis Date     Isolated proteinuria 12/16/2019    UACR 200 on 11/11/2019       No past surgical history on file.   Family History   Problem Relation Age of Onset     Thyroid Disease Mother      Diabetes Mother      Thyroid Disease Maternal Grandmother       "Hypothyroidism Father      Tremor Father         Patient reports \"hereditary tremors\"        Social History:  ?  History   Smoking Status     Never Smoker   Smokeless Tobacco     Never Used     History   Drug Use No     Social History    Substance and Sexual Activity      Alcohol use: No      Allergies:  ?   No Known Allergies     Medications:    Current Outpatient Medications   Medication     Ferrous Gluconate (IRON 27 PO)     ibuprofen (ADVIL/MOTRIN) 600 MG tablet     Vitamin D2 (Ergocalciferol) 50 MCG (2000 UT) CAPS     No current facility-administered medications for this visit.       Physical Exam:  ?  Vitals:  /70   Ht 1.727 m (5' 8\")   Wt 56.2 kg (124 lb)   LMP 02/14/2022 (Within Weeks)   BMI 18.85 kg/m     General:  WD/WN, in NAD.  A&O x3.  Dermatologic:    Skin is intact, open lesions absent.   Skin texture, turgor is normal.  Vascular:  Pulses palpable bilateral.  Digital capillary refill time normal bilateral.  Skin temperature is normal bilateral.  Generalized edema- none bilateral.  Focal edema- mild focal bursas 5th MPJ bilateral.  Neurologic:    Gross sensation normal.  Gait and balance normal.  Musculoskeletal:  Maximal pain to palpation of 5th met bursas bilateral.  Plantarlateral bursas present.    moderate pain to palpation of thicekeing of EHL retinaculum, posisble ganlgion, left.    1st MPJ ROM full, pain free bilateral.    Muscle strength 5/5  foot and ankle bilateral.    Stance:  RCSP Valgus bilateral.  Clinical deformity: mild tailor's bunions, 5th digit in varus & faces sideways    Imaging:   x-ray independently reviewed and interpreted by myself today.  Weight-bearing views left foot dated 04/15/22, reveal mild tailor's bunions, 4/5 HATTIE ~8-9 deg but lateral condyles visible laterally & varus rotation of digit present.  Pes valgus.  1st MPJ normal L                  Again, thank you for allowing me to participate in the care of your patient.        Sincerely,        Stephanie DIAZ" LISETH Logan

## 2022-04-18 ENCOUNTER — TELEPHONE (OUTPATIENT)
Dept: ORTHOPEDICS | Facility: CLINIC | Age: 27
End: 2022-04-18
Payer: COMMERCIAL

## 2022-04-18 NOTE — TELEPHONE ENCOUNTER
Patient has a referral from Dr. Logan, surgical podiatrist, for a U/S guided injection of 5th met bursas.    Please call patient to assist with scheduling appt.    Thank you!

## 2022-04-18 NOTE — TELEPHONE ENCOUNTER
ATC LVM for patient to schedule appointment for injection of both feet. Provided call back number of [phone] to schedule that appointment with Dr. Emilia Castro or Dr. Trae Holt.     TAJ Hinojosa

## 2022-04-25 ENCOUNTER — ANCILLARY PROCEDURE (OUTPATIENT)
Dept: MRI IMAGING | Facility: CLINIC | Age: 27
End: 2022-04-25
Attending: PODIATRIST
Payer: COMMERCIAL

## 2022-04-25 DIAGNOSIS — M67.472 GANGLION CYST OF LEFT FOOT: ICD-10-CM

## 2022-04-25 PROCEDURE — 73718 MRI LOWER EXTREMITY W/O DYE: CPT | Mod: LT | Performed by: RADIOLOGY

## 2022-04-25 NOTE — TELEPHONE ENCOUNTER
DIAGNOSIS: U/S guided injection of 5th met bursas, of both feet   APPOINTMENT DATE: 5.12.22   NOTES STATUS DETAILS   OFFICE NOTE from referring provider Internal 4.26.22, 4.15.22 Doreen,    OFFICE NOTE from other specialist Internal 8.23.21 [facility]   DISCHARGE SUMMARY from hospital     DISCHARGE REPORT from the ER     OPERATIVE REPORT     EMG report     MEDICATION LIST Internal    MRI     DEXA (osteoporosis/bone health)     CT SCAN     XRAYS (IMAGES & REPORTS) Internal 8.23.21 bilat feet

## 2022-04-29 ENCOUNTER — OFFICE VISIT (OUTPATIENT)
Dept: PODIATRY | Facility: CLINIC | Age: 27
End: 2022-04-29
Payer: COMMERCIAL

## 2022-04-29 VITALS
WEIGHT: 124 LBS | BODY MASS INDEX: 18.79 KG/M2 | SYSTOLIC BLOOD PRESSURE: 104 MMHG | DIASTOLIC BLOOD PRESSURE: 64 MMHG | HEIGHT: 68 IN

## 2022-04-29 DIAGNOSIS — Q66.6 PES VALGUS: ICD-10-CM

## 2022-04-29 DIAGNOSIS — M21.622 TAILOR'S BUNION OF BOTH FEET: ICD-10-CM

## 2022-04-29 DIAGNOSIS — M21.621 TAILOR'S BUNION OF BOTH FEET: ICD-10-CM

## 2022-04-29 DIAGNOSIS — M25.475 JOINT EFFUSION, FOOT, LEFT: Primary | ICD-10-CM

## 2022-04-29 PROCEDURE — 99214 OFFICE O/P EST MOD 30 MIN: CPT | Performed by: PODIATRIST

## 2022-04-29 NOTE — PATIENT INSTRUCTIONS
PATIENT INSTRUCTIONS - Podiatry / Foot & Ankle Surgery    Sports med - injections to bursae & EHL tendon, Left        Physical Therapy  You have been referred to [facility].  Locations can be found online.  Please call to schedule an appointment:  [phone]        If you want to pursue the orthotics, call a location to check insurance:    [place] [facility] LOCATIONS  [facility]  [address]  [place] [address]  Phone: [phone]  Fax: [phone] [facility]  [address]  [place] [address]  Phone: [phone]   Fax: [phone]   [facility]  [address]  [place] [address]  Phone: [phone]  Fax: [phone] [facility]  [address]  [place] [address]  Phone: [phone]   Fax: [phone]   [facility]   [address]  [place] [address]  Phone: [phone]  Fax: [phone] * Please call any location listed to make an appointment for a casting/fitting. Your referral was sent to their central office and they will all have the order on file.         I can inject the big toe joint if needed

## 2022-04-29 NOTE — LETTER
"    4/29/2022         RE: Lissy Foote  [address]  [facility] [address]        Dear Colleague,    Thank you for referring your patient, Lissy Foote, to the [facility]. Please see a copy of my visit note below.    Assessment:      ICD-10-CM    1. Joint effusion, foot, left  M25.475    2. Tailor's bunion of both feet  M21.621     M21.622    3. Pes valgus  Q66.6         Plan:  No orders of the defined types were placed in this encounter.      Discussed the etiology and treatment of the condition with the patient.  Imaging studies reviewed and discussed with the patient.  Discussed surgical and conservative options.  MRI reviewed - 5th met bursitis, 1st MPJ effusion, mild EHL tenosynovitis  Discussed occasionally can have cartilage only lesion / flap in 1st MPJ that not picked up on MRI.  We can inject joint If needed to help diagnose this; but only if painful    She has most symptoms in the EHL and bursas.  Discussed f/u with sports med for injections here.    Also discussed tailor's correction & bursa excision if not improved      Return:  No follow-ups on file.    Stephanie Logan DPM                Chief Complaint:     Patient presents with:  RECHECK     bilateral foot pain    HPI:  Lissy Foote is a 27 year old year old female who presents for evaluation of foot pain.    Pain location- 5th MPJs b/l   Plantar lateral bursas  Also lump over big toe on L  Lump was small, has gotten larger, rubs in shoes  She noticed when she started doing cardio more, everything is worse      Past Medical & Surgical History:  Past Medical History:   Diagnosis Date     Isolated proteinuria 12/16/2019    UACR 200 on 11/11/2019       No past surgical history on file.   Family History   Problem Relation Age of Onset     Thyroid Disease Mother      Diabetes Mother      Thyroid Disease Maternal Grandmother      Hypothyroidism Father      Tremor Father         Patient reports \"hereditary tremors\"        Social " "History:  ?  History   Smoking Status     Never Smoker   Smokeless Tobacco     Never Used     History   Drug Use No     Social History    Substance and Sexual Activity      Alcohol use: No      Allergies:  ?   No Known Allergies     Medications:    Current Outpatient Medications   Medication     Ferrous Gluconate (IRON 27 PO)     ibuprofen (ADVIL/MOTRIN) 600 MG tablet     Vitamin D2 (Ergocalciferol) 50 MCG (2000 UT) CAPS     No current facility-administered medications for this visit.       Physical Exam:  ?  Vitals:  /64   Ht 1.727 m (5' 8\")   Wt 56.2 kg (124 lb)   BMI 18.85 kg/m     General:  WD/WN, in NAD.  A&O x3.  Dermatologic:    Skin is intact, open lesions absent.   Skin texture, turgor is normal.  Vascular:  Pulses palpable bilateral.  Digital capillary refill time normal bilateral.  Skin temperature is normal bilateral.  Generalized edema- none bilateral.  Focal edema- mild focal bursas 5th MPJ bilateral.  Neurologic:    Gross sensation normal.  Gait and balance normal.  Musculoskeletal:  Maximal pain to palpation of 5th met bursas bilateral.    Plantarlateral bursas present.    Moderate pain to palpation of thicekeing of EHL retinaculum, posisble ganlgion, left.    1st MPJ ROM full, pain free bilateral.    Muscle strength 5/5  foot and ankle bilateral.    Stance:  RCSP Valgus bilateral.  Clinical deformity: mild tailor's bunions, 5th digit in varus & faces sideways    Imaging:     MRI independently reviewed and interpreted by myself today.  left foot dated 4/25/22, reveal effusion 1st MPJ, no focal articular defects.  EHL tenosynovitis, mild compared to joint effusion.  No appreciable degeneration or focal cartilage defect. Sesamoids intact.      x-ray independently reviewed and interpreted by myself today.  Weight-bearing views left foot dated 04/15/22, reveal mild flexible flatfoot, AP meary's 14 deg, mild tailor's bunions, 4/5 HATTIE ~9-10 deg but lateral condyles visible laterally & varus " rotation of digit present.   1st MPJ normal L.  Long 1st ray overall, 1st met of appropriate length.                Again, thank you for allowing me to participate in the care of your patient.        Sincerely,        Stephanie Logan DPM

## 2022-04-29 NOTE — PROGRESS NOTES
"Assessment:      ICD-10-CM    1. Joint effusion, foot, left  M25.475    2. Tailor's bunion of both feet  M21.621     M21.622    3. Pes valgus  Q66.6         Plan:  No orders of the defined types were placed in this encounter.      Discussed the etiology and treatment of the condition with the patient.  Imaging studies reviewed and discussed with the patient.  Discussed surgical and conservative options.  MRI reviewed - 5th met bursitis, 1st MPJ effusion, mild EHL tenosynovitis  Discussed occasionally can have cartilage only lesion / flap in 1st MPJ that not picked up on MRI.  We can inject joint If needed to help diagnose this; but only if painful    She has most symptoms in the EHL and bursas.  Discussed f/u with sports med for injections here.    Also discussed tailor's correction & bursa excision if not improved      Return:  No follow-ups on file.    Stephanie Logan DPM                Chief Complaint:     Patient presents with:  RECHECK     bilateral foot pain    HPI:  Lissy Foote is a 27 year old year old female who presents for evaluation of foot pain.    Pain location- 5th MPJs b/l   Plantar lateral bursas  Also lump over big toe on L  Lump was small, has gotten larger, rubs in shoes  She noticed when she started doing cardio more, everything is worse      Past Medical & Surgical History:  Past Medical History:   Diagnosis Date     Isolated proteinuria 12/16/2019    UACR 200 on 11/11/2019       No past surgical history on file.   Family History   Problem Relation Age of Onset     Thyroid Disease Mother      Diabetes Mother      Thyroid Disease Maternal Grandmother      Hypothyroidism Father      Tremor Father         Patient reports \"hereditary tremors\"        Social History:  ?  History   Smoking Status     Never Smoker   Smokeless Tobacco     Never Used     History   Drug Use No     Social History    Substance and Sexual Activity      Alcohol use: No      Allergies:  ?   No Known Allergies " "    Medications:    Current Outpatient Medications   Medication     Ferrous Gluconate (IRON 27 PO)     ibuprofen (ADVIL/MOTRIN) 600 MG tablet     Vitamin D2 (Ergocalciferol) 50 MCG (2000 UT) CAPS     No current facility-administered medications for this visit.       Physical Exam:  ?  Vitals:  /64   Ht 1.727 m (5' 8\")   Wt 56.2 kg (124 lb)   BMI 18.85 kg/m     General:  WD/WN, in NAD.  A&O x3.  Dermatologic:    Skin is intact, open lesions absent.   Skin texture, turgor is normal.  Vascular:  Pulses palpable bilateral.  Digital capillary refill time normal bilateral.  Skin temperature is normal bilateral.  Generalized edema- none bilateral.  Focal edema- mild focal bursas 5th MPJ bilateral.  Neurologic:    Gross sensation normal.  Gait and balance normal.  Musculoskeletal:  Maximal pain to palpation of 5th met bursas bilateral.    Plantarlateral bursas present.    Moderate pain to palpation of thicekeing of EHL retinaculum, posisble ganlgion, left.    1st MPJ ROM full, pain free bilateral.    Muscle strength 5/5  foot and ankle bilateral.    Stance:  RCSP Valgus bilateral.  Clinical deformity: mild tailor's bunions, 5th digit in varus & faces sideways    Imaging:     MRI independently reviewed and interpreted by myself today.  left foot dated 4/25/22, reveal effusion 1st MPJ, no focal articular defects.  EHL tenosynovitis, mild compared to joint effusion.  No appreciable degeneration or focal cartilage defect. Sesamoids intact.      x-ray independently reviewed and interpreted by myself today.  Weight-bearing views left foot dated 04/15/22, reveal mild flexible flatfoot, AP meary's 14 deg, mild tailor's bunions, 4/5 HATTIE ~9-10 deg but lateral condyles visible laterally & varus rotation of digit present.   1st MPJ normal L.  Long 1st ray overall, 1st met of appropriate length.            "

## 2022-05-11 ENCOUNTER — MYC MEDICAL ADVICE (OUTPATIENT)
Dept: PODIATRY | Facility: CLINIC | Age: 27
End: 2022-05-11
Payer: COMMERCIAL

## 2022-05-11 DIAGNOSIS — Q66.6 PES VALGUS: Primary | ICD-10-CM

## 2022-05-11 DIAGNOSIS — M77.51 BURSITIS OF BOTH FEET: ICD-10-CM

## 2022-05-11 DIAGNOSIS — M21.621 TAILOR'S BUNION OF BOTH FEET: ICD-10-CM

## 2022-05-11 DIAGNOSIS — M77.52 BURSITIS OF BOTH FEET: ICD-10-CM

## 2022-05-11 DIAGNOSIS — M21.622 TAILOR'S BUNION OF BOTH FEET: ICD-10-CM

## 2022-05-11 NOTE — TELEPHONE ENCOUNTER
Physical Therapy was noted on AVS, but not clearly discussed in office visit note plan from 4/29/22 office visit.     Physical Therapy order pending for provider review.     Candace Villeda, ATC

## 2022-05-12 ENCOUNTER — PRE VISIT (OUTPATIENT)
Dept: ORTHOPEDICS | Facility: CLINIC | Age: 27
End: 2022-05-12
Payer: COMMERCIAL

## 2022-05-12 ENCOUNTER — OFFICE VISIT (OUTPATIENT)
Dept: ORTHOPEDICS | Facility: CLINIC | Age: 27
End: 2022-05-12
Attending: PODIATRIST
Payer: COMMERCIAL

## 2022-05-12 VITALS — HEIGHT: 68 IN | WEIGHT: 124 LBS | BODY MASS INDEX: 18.79 KG/M2

## 2022-05-12 DIAGNOSIS — M77.52 BURSITIS OF BOTH FEET: ICD-10-CM

## 2022-05-12 DIAGNOSIS — M77.51 BURSITIS OF BOTH FEET: ICD-10-CM

## 2022-05-12 PROCEDURE — 20604 DRAIN/INJ JOINT/BURSA W/US: CPT | Mod: 50 | Performed by: STUDENT IN AN ORGANIZED HEALTH CARE EDUCATION/TRAINING PROGRAM

## 2022-05-12 PROCEDURE — 99207 PR DROP WITH A PROCEDURE: CPT | Mod: GC | Performed by: STUDENT IN AN ORGANIZED HEALTH CARE EDUCATION/TRAINING PROGRAM

## 2022-05-12 RX ORDER — LIDOCAINE HYDROCHLORIDE 10 MG/ML
1 INJECTION, SOLUTION EPIDURAL; INFILTRATION; INTRACAUDAL; PERINEURAL
Status: SHIPPED | OUTPATIENT
Start: 2022-05-12

## 2022-05-12 RX ORDER — TRIAMCINOLONE ACETONIDE 40 MG/ML
40 INJECTION, SUSPENSION INTRA-ARTICULAR; INTRAMUSCULAR
Status: SHIPPED | OUTPATIENT
Start: 2022-05-12

## 2022-05-12 RX ADMIN — LIDOCAINE HYDROCHLORIDE 1 ML: 10 INJECTION, SOLUTION EPIDURAL; INFILTRATION; INTRACAUDAL; PERINEURAL at 13:18

## 2022-05-12 RX ADMIN — TRIAMCINOLONE ACETONIDE 40 MG: 40 INJECTION, SUSPENSION INTRA-ARTICULAR; INTRAMUSCULAR at 13:18

## 2022-05-12 NOTE — PROGRESS NOTES
Attending Note:   I have directly supervised the MSK US injections.     [facility]  Sports Medicine and Bone Health

## 2022-05-12 NOTE — PROGRESS NOTES
Small Joint Injection: bilateral intertarsal    Date/Time: 5/12/2022 1:18 PM  Performed by: Trae Holt MD  Authorized by: Patricia Stark MD   Indications:  Pain  Needle Size:  25 G  Guidance: ultrasound     Approach:  Plantar  Location:  Foot  Laterality:  Bilateral  Site:  Bilateral intertarsal  Medications (Right):  40 mg triamcinolone 40 MG/ML; 1 mL lidocaine (PF) 1 %        Medications (Left):  40 mg triamcinolone 40 MG/ML; 1 mL lidocaine (PF) 1 %                Outcome:  Tolerated well, no immediate complications  Procedure discussed: discussed risks, benefits, and alternatives      Timeout: timeout called immediately prior to procedure    Prep: patient was prepped and draped in usual sterile fashion

## 2022-05-12 NOTE — LETTER
5/12/2022      RE: Lissy Foote  [address]  [facility] [address]     Dear Colleague,    Thank you for referring your patient, Lissy Foote, to the [facility] [place]. Please see a copy of my visit note below.    Small Joint Injection: bilateral intertarsal    Date/Time: 5/12/2022 1:18 PM  Performed by: Trae Holt MD  Authorized by: Patricia Stark MD   Indications:  Pain  Needle Size:  25 G  Guidance: ultrasound     Approach:  Plantar  Location:  Foot  Laterality:  Bilateral  Site:  Bilateral intertarsal  Medications (Right):  40 mg triamcinolone 40 MG/ML; 1 mL lidocaine (PF) 1 %        Medications (Left):  40 mg triamcinolone 40 MG/ML; 1 mL lidocaine (PF) 1 %                Outcome:  Tolerated well, no immediate complications  Procedure discussed: discussed risks, benefits, and alternatives      Timeout: timeout called immediately prior to procedure    Prep: patient was prepped and draped in usual sterile fashion              4/5th inter-metatarsal bursa injection- Ultrasound Guided    The patient was informed of the risks and the benefits of the procedure and a written consent was signed.    The patient s right plantar forefoot was prepped with chlorhexidine in sterile fashion.     40 mg of methylprednisolone suspension was drawn up into a 3 mL syringe with 1 mL of 1% lidocaine.    Injection was performed using sterile technique.  Under ultrasound guidance a 1.5-inch 25-gauge needle was used to enter the bursa between the heads of the 4th and 5th metatarsals.  Needle placement was visualized and documented with ultrasound.  Needle placed in long axis to the probe.  Ultrasound visualization was necessary due to accuracy of required.  Images were permanently stored for the patient's record. There were no complications. The patient tolerated the procedure well. There was negligible bleeding.    An identical procedure was then performed on the patients left  foot.    The patient was instructed to ice the toe upon leaving clinic and refrain from overuse over the next 3 days.     The patient was instructed to call or go to the emergency room with any unusual pain, swelling, redness, or if otherwise concerned.      Options for treatment and/or follow-up care were reviewed with the patient was actively involved in the decision making process. Patient verbalized understanding and was in agreement with the plan.    The patient was seen by and discussed with Dr.Suzanne PATIENCE Stark MD, CAQ, CCD    Trae Holt DO PGY-4  [facility] Sports Medicine Fellow 2021-22    Attending Note:   I have directly supervised the MSK US injections.     [facility]  Sports Medicine and Bone Health      Again, thank you for allowing me to participate in the care of your patient.      Sincerely,    Trae Holt MD

## 2022-05-12 NOTE — NURSING NOTE
[address] [address]  Dept: [phone]  ______________________________________________________________________________    Patient: Lissy Foote   : 1995   MRN: [id number]   May 12, 2022    INVASIVE PROCEDURE SAFETY CHECKLIST    Date: 22   Procedure:Bilateral foot 5th metatarsal bursa injections  Patient Name: Lissy Foote  MRN: [id number]  YOB: 1995    Action: Complete sections as appropriate. Any discrepancy results in a HARD COPY until resolved.     PRE PROCEDURE:  Patient ID verified with 2 identifiers (name and  or MRN): Yes  Procedure and site verified with patient/designee (when able): Yes  Accurate consent documentation in medical record: Yes  H&P (or appropriate assessment) documented in medical record: Yes  H&P must be up to 20 days prior to procedure and updates within 24 hours of procedure as applicable: NA  Relevant diagnostic and radiology test results appropriately labeled and displayed as applicable: Yes  Procedure site(s) marked with provider initials: NA    TIMEOUT:  Time-Out performed immediately prior to starting procedure, including verbal and active participation of all team members addressing the following:Yes  * Correct patient identify  * Confirmed that the correct side and site are marked  * An accurate procedure consent form  * Agreement on the procedure to be done  * Correct patient position  * Relevant images and results are properly labeled and appropriately displayed  * The need to administer antibiotics or fluids for irrigation purposes during the procedure as applicable   * Safety precautions based on patient history or medication use    DURING PROCEDURE: Verification of correct person, site, and procedures any time the responsibility for care of the patient is transferred to another member of the care team.       Prior to injection, verified patient identity using patient's name and date of  birth.  Due to injection administration, patient instructed to remain in clinic for 15 minutes  afterwards, and to report any adverse reaction to me immediately.    Bursa injection was performed.      Drug Amount Wasted:  Yes: 3 mg/ml   Vial/Syringe: Single dose vial  Expiration Date:  01/01/2025      Sun Jacob, ATC  May 12, 2022

## 2022-05-12 NOTE — PROGRESS NOTES
4/5th inter-metatarsal bursa injection- Ultrasound Guided    The patient was informed of the risks and the benefits of the procedure and a written consent was signed.    The patient s right plantar forefoot was prepped with chlorhexidine in sterile fashion.     40 mg of methylprednisolone suspension was drawn up into a 3 mL syringe with 1 mL of 1% lidocaine.    Injection was performed using sterile technique.  Under ultrasound guidance a 1.5-inch 25-gauge needle was used to enter the bursa between the heads of the 4th and 5th metatarsals.  Needle placement was visualized and documented with ultrasound.  Needle placed in long axis to the probe.  Ultrasound visualization was necessary due to accuracy of required.  Images were permanently stored for the patient's record. There were no complications. The patient tolerated the procedure well. There was negligible bleeding.    An identical procedure was then performed on the patients left foot.    The patient was instructed to ice the toe upon leaving clinic and refrain from overuse over the next 3 days.     The patient was instructed to call or go to the emergency room with any unusual pain, swelling, redness, or if otherwise concerned.      Options for treatment and/or follow-up care were reviewed with the patient was actively involved in the decision making process. Patient verbalized understanding and was in agreement with the plan.    The patient was seen by and discussed with Dr.Suzanne PATIENCE Stark MD, CAQ, CCD    Trae Holt DO PGY-4  [facility] Sports Medicine Fellow 2021-22

## 2022-05-15 ENCOUNTER — HEALTH MAINTENANCE LETTER (OUTPATIENT)
Age: 27
End: 2022-05-15

## 2022-05-17 ENCOUNTER — THERAPY VISIT (OUTPATIENT)
Dept: PHYSICAL THERAPY | Facility: CLINIC | Age: 27
End: 2022-05-17
Attending: PODIATRIST
Payer: COMMERCIAL

## 2022-05-17 DIAGNOSIS — M79.672 FOOT PAIN, BILATERAL: ICD-10-CM

## 2022-05-17 DIAGNOSIS — M77.52 BURSITIS OF BOTH FEET: ICD-10-CM

## 2022-05-17 DIAGNOSIS — M77.51 BURSITIS OF BOTH FEET: ICD-10-CM

## 2022-05-17 DIAGNOSIS — M21.621 TAILOR'S BUNION OF BOTH FEET: ICD-10-CM

## 2022-05-17 DIAGNOSIS — M79.671 FOOT PAIN, BILATERAL: ICD-10-CM

## 2022-05-17 DIAGNOSIS — Q66.6 PES VALGUS: ICD-10-CM

## 2022-05-17 DIAGNOSIS — M21.622 TAILOR'S BUNION OF BOTH FEET: ICD-10-CM

## 2022-05-17 PROCEDURE — 97161 PT EVAL LOW COMPLEX 20 MIN: CPT | Mod: GP | Performed by: PHYSICAL THERAPIST

## 2022-05-17 PROCEDURE — 97110 THERAPEUTIC EXERCISES: CPT | Mod: GP | Performed by: PHYSICAL THERAPIST

## 2022-05-17 ASSESSMENT — ACTIVITIES OF DAILY LIVING (ADL)
HIGHEST_POTENTIAL_ADL_SCORE:: 68
TOTAL_ADL_ITEM_SCORE:: 30

## 2022-05-17 NOTE — PROGRESS NOTES
Physical Therapy Initial Evaluation  Subjective:  The history is provided by the patient.   Patient Health History  Lissy Foote being seen for bilateral foot pain.     Date of Onset: MD order 5/11/22. chronic 1 year.   Problem occurred: unknown, noticed after doing more cardio   Pain score: 1/10 today. 8-9/10 at worst   General health as reported by patient is good.                                       Therapist Generated HPI Evaluation  Problem details: MD order 5/11/22  Pt reports bilateral foot pain that began last year. Thinks it may have started after doing more exercise. Did notice some bumps on outside of foot as well. Xray and MRI show bilateral taylors bunion. Gets pain on lateral foot and plantar surface. Some pain can go up right lateral lower leg. Notices a tight toe on right side. Also pain on left great toe, might get injection for this next week, but this is TBD.  On 5/12/22 had 4/5th inter-metatarsal bursa injection. This has really helped the bumps go away and less pain.  Walking is the most bothersome thing. Is on her feet a lot at work which is tough.  Is resting from exercise right now. Would like to be doing long walking and being able to stand longer periods.   Reports in her family they have flat feet..         Type of problem:  Bilateral feet.    This is a chronic condition.  Condition occurred with:  Insidious onset.  Where condition occurred: for unknown reasons.  Patient reports pain:  Lateral and longitudinal arch (left great toe).  Pain is described as burning and is intermittent.  Pain radiates to:  Lower leg. Pain is the same all the time.  Since onset symptoms are gradually improving (since injection).  Associated symptoms:  Other (tightness). Symptoms are exacerbated by weight bearing, standing and walking  and relieved by rest and other (stretching. uses a ball from freezer to roll bottom of foot).  Special tests included:  X-ray and MRI.  Previous treatment includes other  (injecion). There was moderate improvement following previous treatment.                          Objective:  Standing Alignment:                Ankle/Foot:  Pes planus R and pes planus L        Flexibility/Screens:       Lower Extremity:  Decreased left lower extremity flexibility:Hamstrings    Decreased right lower extremity flexibility:  Hamstrings          Ankle/Foot Evaluation  ROM:    AROM:    Dorsiflexion:  Left:   9  Right:   7  Plantarflexion:  Left:  53    Right:  55  Inversion:  Left:  38     Right:  38  Eversion:  17     Right:  17        Strength:    Dorsiflexion:  Left: 5/5     Pain:   Right: 5/5   Pain:  Plantarflexion: Left: 5/5   Pain:   Right: 5/5  Pain:  Inversion:Left: 5/5  Pain:     Right: 5/5  Pain:  Eversion:Left: 4+/5  Pain:  Right: 4+/5  Pain:  Flexion Great Toe:Left: 5/5  Pain:  Right: 5/5   Pain:  Extension Great Toe:Left: 5/5  Pain:  Right: 5/5  Pain:                  PALPATION: Palpation of ankle: (-) TTP at left: arch/lateral foot/GT. (-) TTP right: lateral foot.    Left ankle tenderness not present at:   plantar fascia  Right ankle tenderness present at:   plantar fascia      FUNCTIONAL TESTS:           Proprioception:  Stork Balance Test: Left: EO < 5 sec  Right: EO < 5 sec                                          Hip Evaluation    Hip Strength:      Extension:  Left: 4/5  Pain:Right: 4/5    Pain:    Abduction:  Left: 4-/5     Pain:Right: 4-/5    Pain:                                 General     ROS    Assessment/Plan:    Patient is a 27 year old female with bilateral foot complaints.    Patient has the following significant findings with corresponding treatment plan.                Diagnosis 1:  Bilateral foot pain    Pain -  self management, education and home program  Decreased ROM/flexibility - manual therapy, therapeutic exercise and home program  Decreased strength - therapeutic exercise, therapeutic activities and home program  Impaired balance - neuro re-education, therapeutic  activities and home program  Decreased function - therapeutic activities and home program  Impaired posture - neuro re-education, therapeutic activities and home program    Therapy Evaluation Codes:   1) History comprised of:   Personal factors that impact the plan of care:      None.    Comorbidity factors that impact the plan of care are:      None.     Medications impacting care: None.  2) Examination of Body Systems comprised of:   Body structures and functions that impact the plan of care:      feet.   Activity limitations that impact the plan of care are:      Standing, Walking and Working.  3) Clinical presentation characteristics are:   Stable/Uncomplicated.  4) Decision-Making    Low complexity using standardized patient assessment instrument and/or measureable assessment of functional outcome.  Cumulative Therapy Evaluation is: Low complexity.    Previous and current functional limitations:  (See Goal Flow Sheet for this information)    Short term and Long term goals: (See Goal Flow Sheet for this information)     Communication ability:  Patient appears to be able to clearly communicate and understand verbal and written communication and follow directions correctly.  Treatment Explanation - The following has been discussed with the patient:   RX ordered/plan of care  Anticipated outcomes  Possible risks and side effects  This patient would benefit from PT intervention to resume normal activities.   Rehab potential is good.    Frequency:  2 X a month, once daily  Duration:  for 3 months  Discharge Plan:  Achieve all LTG.  Independent in home treatment program.  Reach maximal therapeutic benefit.    Please refer to the daily flowsheet for treatment today, total treatment time and time spent performing 1:1 timed codes.

## 2022-05-17 NOTE — PROGRESS NOTES
[facility]    OUTPATIENT Physical Therapy ORTHOPEDIC EVALUATION  PLAN OF TREATMENT FOR OUTPATIENT REHABILITATION  (COMPLETE FOR INITIAL CLAIMS ONLY)  Patient's Last Name, First Name, M.I.  YOB: 1995  Lissy Foote    Provider s Name:  [facility]   Medical Record No.  [id number]   Start of Care Date:  05/17/22   Onset Date:    (MD order 5/11/22. Onset 1 year ago)   Type:     _X__PT   ___OT Medical Diagnosis:    Encounter Diagnoses   Name Primary?    Pes valgus     Bursitis of both feet     Tailor's bunion of both feet     Foot pain, bilateral         Treatment Diagnosis:  B foot pain        Goals:     05/17/22 0500   Body Part   Goals listed below are for B feet   Goal #1   Goal #1 ambulation   Previous Functional Level No restrictions   Current Functional Level Minutes patient can walk   Performance Level 5-10, pain 9/10   STG Target Performance Minutes patient will be able to walk   Performance Level >10 min, pain <6/10   Rationale for safe outdoor household ambulation;for safe community ambulation;for safe work place ambulation;to promote a healthy and active lifestyle   Due Date 06/07/22    LTG Target Performance Minutes patient will be able to  walk   Performance Level 20-30, pain 0-1/10   Rationale for safe outdoor household ambulation;for safe community ambulation;for safe work place ambulation;to promote a healthy and active lifestyle   Due Date 08/14/22       Therapy Frequency:  2x/month  Predicted Duration of Therapy Intervention:  3 months    Sujatha Wu, PT                 I CERTIFY THE NEED FOR THESE SERVICES FURNISHED UNDER        THIS PLAN OF TREATMENT AND WHILE UNDER MY CARE     (Physician attestation of this document indicates review and certification of the therapy plan).                     Certification Date From:  05/17/22   Certification Date  To:  08/14/22    Referring Provider:  Stephanie Logan    Initial Assessment        See Epic Evaluation SOC Date: 05/17/22

## 2022-05-17 NOTE — PROGRESS NOTES
Physical Therapy Initial Evaluation  Subjective:    Patient Health History         Pain is reported as 1/10 on pain scale.  General health as reported by patient is good.  Pertinent medical history includes: anemia.   Red flags:  None as reported by patient.  Medical allergies: none.   Surgeries include:  None.    Current medications:  None.    Current occupation is Childcare center teacher.   Primary job tasks include:  Lifting/carrying and prolonged standing.                                    Objective:  System    Physical Exam    General     ROS    Assessment/Plan:

## 2022-05-26 ENCOUNTER — OFFICE VISIT (OUTPATIENT)
Dept: ORTHOPEDICS | Facility: CLINIC | Age: 27
End: 2022-05-26
Payer: COMMERCIAL

## 2022-05-26 VITALS — HEIGHT: 68 IN | WEIGHT: 124 LBS | BODY MASS INDEX: 18.79 KG/M2

## 2022-05-26 DIAGNOSIS — M77.51 BURSITIS OF BOTH FEET: Primary | ICD-10-CM

## 2022-05-26 DIAGNOSIS — M77.52 BURSITIS OF BOTH FEET: Primary | ICD-10-CM

## 2022-05-26 PROCEDURE — 99207 PR NO CHARGE LOS: CPT | Performed by: FAMILY MEDICINE

## 2022-05-26 NOTE — PROGRESS NOTES
"[facility]  Sports Medicine Clinic  Clinics and Surgery Center           SUBJECTIVE       Lissy Foote is a 27 year old female presenting to clinic today for follow up of bilateral foot pain following adventious bursa injections between the 4/5th metatarsal heads.    Lissy states she is doing really well. She had some pain right after the injection but since then has had very little discomfort. Her pain is a 0/10 today and she notices maybe a 1/10 pain if walking barefoot on hardwood. She started working with physical therapy that she also thinks is helping. She is not requiring any oral medications.     PMH, Medications and Allergies were reviewed and updated as needed.    ROS:  As noted above otherwise negative.    Patient Active Problem List   Diagnosis     Social anxiety disorder     Iron deficiency     Constipation     History of Helicobacter pylori infection     Foot pain, bilateral       Current Outpatient Medications   Medication Sig Dispense Refill     Ferrous Gluconate (IRON 27 PO) Take 1 tablet by mouth daily Blood Builder       ibuprofen (ADVIL/MOTRIN) 600 MG tablet Take 1 tablet (600 mg) by mouth every 6 hours as needed for moderate pain 21 tablet 0     Vitamin D2 (Ergocalciferol) 50 MCG (2000 UT) CAPS Take 2,000 Units by mouth daily               OBJECTIVE:       Vitals:   Vitals:    05/26/22 1621   Weight: 56.2 kg (124 lb)   Height: 1.727 m (5' 8\")     BMI: Body mass index is 18.85 kg/m .    Gen:  Well nourished and in no acute distress    MSK: Bilateral feet show no significant overlying skin changes, deformity or swelling. There is no TTP of the webspace between the 4/5th digits bilaterally or at the point between the heads of the 4/5th metatarsal heads bilaterally. She has no pain with palpation or with passive/active plantarflexion/dorsiflexion of the left great toe.             ASSESSMENT and PLAN:     Lissy was seen today for recheck and recheck.    Diagnoses and all orders for this " visit:    Bursitis of both feet: Much improved following CSI to the bursas bilaterally. At this point patient is basically asymptomatic except for certain times when she walks with bare feet at home on hardwood. I encouraged her to continue working with PT and obtain indoor footwear to prevent microtrauma to the bursas. She will follow up with us prn if she has new or worsening symptoms.     Options for treatment and/or follow-up care were reviewed with the patient was actively involved in the decision making process. Patient verbalized understanding and was in agreement with the plan.    The patient was seen by and discussed with Dr.Suzanne PATIENCE Stark MD, CAQ, CCD    Trae Holt DO PGY-4  [facility] Sports Medicine Fellow 2021-22

## 2022-05-26 NOTE — LETTER
"5/26/2022  RE: Lissy Foote  [address]  [facility] [address]     Dear Colleague,    Thank you for referring your patient, Lissy Foote, to the [facility] SPORTS MEDICINE [facility] [place]. Please see a copy of my visit note below.    [facility]  Sports Medicine Clinic  Clinics and Surgery Center         SUBJECTIVE       Lissy Foote is a 27 year old female presenting to clinic today for follow up of bilateral foot pain following adventious bursa injections between the 4/5th metatarsal heads.    Lissy states she is doing really well. She had some pain right after the injection but since then has had very little discomfort. Her pain is a 0/10 today and she notices maybe a 1/10 pain if walking barefoot on hardwood. She started working with physical therapy that she also thinks is helping. She is not requiring any oral medications.     PMH, Medications and Allergies were reviewed and updated as needed.    ROS:  As noted above otherwise negative.    Patient Active Problem List   Diagnosis     Social anxiety disorder     Iron deficiency     Constipation     History of Helicobacter pylori infection     Foot pain, bilateral     Current Outpatient Medications   Medication Sig Dispense Refill     Ferrous Gluconate (IRON 27 PO) Take 1 tablet by mouth daily Blood Builder       ibuprofen (ADVIL/MOTRIN) 600 MG tablet Take 1 tablet (600 mg) by mouth every 6 hours as needed for moderate pain 21 tablet 0     Vitamin D2 (Ergocalciferol) 50 MCG (2000 UT) CAPS Take 2,000 Units by mouth daily               OBJECTIVE:       Vitals:   Vitals:    05/26/22 1621   Weight: 56.2 kg (124 lb)   Height: 1.727 m (5' 8\")     BMI: Body mass index is 18.85 kg/m .    Gen:  Well nourished and in no acute distress    MSK: Bilateral feet show no significant overlying skin changes, deformity or swelling. There is no TTP of the webspace between the 4/5th digits bilaterally or at the point between the heads of the 4/5th metatarsal " heads bilaterally. She has no pain with palpation or with passive/active plantarflexion/dorsiflexion of the left great toe.             ASSESSMENT and PLAN:     Lissy was seen today for recheck and recheck.    Diagnoses and all orders for this visit:    Bursitis of both feet: Much improved following CSI to the bursas bilaterally. At this point patient is basically asymptomatic except for certain times when she walks with bare feet at home on hardwood. I encouraged her to continue working with PT and obtain indoor footwear to prevent microtrauma to the bursas. She will follow up with us prn if she has new or worsening symptoms.     Options for treatment and/or follow-up care were reviewed with the patient was actively involved in the decision making process. Patient verbalized understanding and was in agreement with the plan.    The patient was seen by and discussed with Dr.Suzanne PATIENCE Stark MD, CAQ, CCD    Trae Holt DO PGY-4  [facility] Sports Medicine Fellow 2021-22    Attending Note:   I have discussed this patient and have reviewed the clinical presentation and progress note with the fellow. I agree with the treatment plan as outlined. The plan was formulated with the fellow on the day of the patient's visit. I have reviewed all imaging with the fellow and agree with the findings in the documentation.  The patient left prior to me seeing her despite being asked to wait briefly for my return with the Fellow.       Patricia Stark MD, CAQ, CCD  [facility]  Sports Medicine and Bone Health

## 2022-05-27 ENCOUNTER — THERAPY VISIT (OUTPATIENT)
Dept: PHYSICAL THERAPY | Facility: CLINIC | Age: 27
End: 2022-05-27
Payer: COMMERCIAL

## 2022-05-27 DIAGNOSIS — M79.672 FOOT PAIN, BILATERAL: Primary | ICD-10-CM

## 2022-05-27 DIAGNOSIS — M79.671 FOOT PAIN, BILATERAL: Primary | ICD-10-CM

## 2022-05-27 PROCEDURE — 97530 THERAPEUTIC ACTIVITIES: CPT | Mod: GP

## 2022-05-27 PROCEDURE — 97110 THERAPEUTIC EXERCISES: CPT | Mod: GP

## 2022-05-30 NOTE — PROGRESS NOTES
Attending Note:   I have discussed this patient and have reviewed the clinical presentation and progress note with the fellow. I agree with the treatment plan as outlined. The plan was formulated with the fellow on the day of the patient's visit. I have reviewed all imaging with the fellow and agree with the findings in the documentation.  The patient left prior to me seeing her despite being asked to wait briefly for my return with the Fellow.     Patricia Stark MD, CAQ, CCD  [facility]  Sports Medicine and Bone Health

## 2022-06-09 ENCOUNTER — TELEPHONE (OUTPATIENT)
Dept: FAMILY MEDICINE | Facility: CLINIC | Age: 27
End: 2022-06-09

## 2022-06-09 DIAGNOSIS — R79.89 ELEVATED VITAMIN B12 LEVEL: Primary | ICD-10-CM

## 2022-06-09 DIAGNOSIS — Z86.39 H/O VITAMIN D DEFICIENCY: ICD-10-CM

## 2022-06-09 NOTE — TELEPHONE ENCOUNTER
Rn called patient to clarify what order she was looking for. Patient stating she wants more extensive panel of vitamins like B12, D, etc. Will route to PCP to order for tomorrow.     Hannah Soto RN

## 2022-06-09 NOTE — TELEPHONE ENCOUNTER
Vitamin D and B12 ordered. Please inform patient that these tests may not be covered by her insurance, she would need to call her insurance to find out.   -Bria Kingsley MD

## 2022-06-09 NOTE — TELEPHONE ENCOUNTER
"[facility] phone call message- general phone call:    Reason for call: Patient is scheduled for lab work tomorrow and wants to know if a \"complete vitamin/mineral test\" will be done during this time. If not, does that patient need to see a provider to get an active request for that lab work?    Return call needed: Yes    OK to leave a message on voice mail? Yes    Primary language: English      needed? No    Call taken on June 9, 2022 at 1:39 PM by Romana Chapa    "

## 2022-06-10 ENCOUNTER — LAB (OUTPATIENT)
Dept: LAB | Facility: CLINIC | Age: 27
End: 2022-06-10
Payer: COMMERCIAL

## 2022-06-10 DIAGNOSIS — R20.2 NUMBNESS AND TINGLING IN BOTH HANDS: ICD-10-CM

## 2022-06-10 DIAGNOSIS — Z86.39 H/O VITAMIN D DEFICIENCY: ICD-10-CM

## 2022-06-10 DIAGNOSIS — R20.2 NUMBNESS AND TINGLING OF BOTH FEET: ICD-10-CM

## 2022-06-10 DIAGNOSIS — D69.6 THROMBOCYTOPENIA (H): ICD-10-CM

## 2022-06-10 DIAGNOSIS — R20.0 NUMBNESS AND TINGLING IN BOTH HANDS: ICD-10-CM

## 2022-06-10 DIAGNOSIS — R79.89 ELEVATED VITAMIN B12 LEVEL: ICD-10-CM

## 2022-06-10 DIAGNOSIS — R20.0 NUMBNESS AND TINGLING OF BOTH FEET: ICD-10-CM

## 2022-06-10 LAB
ALBUMIN SERPL-MCNC: 4.1 G/DL (ref 3.4–5)
ALP SERPL-CCNC: 34 U/L (ref 40–150)
ALT SERPL W P-5'-P-CCNC: 17 U/L (ref 0–50)
ANION GAP SERPL CALCULATED.3IONS-SCNC: 7 MMOL/L (ref 3–14)
AST SERPL W P-5'-P-CCNC: 19 U/L (ref 0–45)
BILIRUB SERPL-MCNC: 1.5 MG/DL (ref 0.2–1.3)
BUN SERPL-MCNC: 13 MG/DL (ref 7–30)
CALCIUM SERPL-MCNC: 9 MG/DL (ref 8.5–10.1)
CHLORIDE BLD-SCNC: 106 MMOL/L (ref 94–109)
CO2 SERPL-SCNC: 27 MMOL/L (ref 20–32)
CREAT SERPL-MCNC: 0.67 MG/DL (ref 0.52–1.04)
CRP SERPL-MCNC: <2.9 MG/L (ref 0–8)
DEPRECATED CALCIDIOL+CALCIFEROL SERPL-MC: 56 UG/L (ref 20–75)
ERYTHROCYTE [DISTWIDTH] IN BLOOD BY AUTOMATED COUNT: 11.9 % (ref 10–15)
GFR SERPL CREATININE-BSD FRML MDRD: >90 ML/MIN/1.73M2
GLUCOSE BLD-MCNC: 115 MG/DL (ref 70–99)
HCT VFR BLD AUTO: 45 % (ref 35–47)
HGB BLD-MCNC: 14.6 G/DL (ref 11.7–15.7)
MCH RBC QN AUTO: 30.4 PG (ref 26.5–33)
MCHC RBC AUTO-ENTMCNC: 32.4 G/DL (ref 31.5–36.5)
MCV RBC AUTO: 94 FL (ref 78–100)
PLATELET # BLD AUTO: 195 10E3/UL (ref 150–450)
POTASSIUM BLD-SCNC: 4.2 MMOL/L (ref 3.4–5.3)
PROT SERPL-MCNC: 8.1 G/DL (ref 6.8–8.8)
RBC # BLD AUTO: 4.81 10E6/UL (ref 3.8–5.2)
SODIUM SERPL-SCNC: 140 MMOL/L (ref 133–144)
TSH SERPL DL<=0.005 MIU/L-ACNC: 2.29 MU/L (ref 0.4–4)
VIT B12 SERPL-MCNC: 401 PG/ML (ref 193–986)
WBC # BLD AUTO: 4.9 10E3/UL (ref 4–11)

## 2022-06-10 PROCEDURE — 84443 ASSAY THYROID STIM HORMONE: CPT

## 2022-06-10 PROCEDURE — 86140 C-REACTIVE PROTEIN: CPT

## 2022-06-10 PROCEDURE — 80053 COMPREHEN METABOLIC PANEL: CPT

## 2022-06-10 PROCEDURE — 85027 COMPLETE CBC AUTOMATED: CPT

## 2022-06-10 PROCEDURE — 36415 COLL VENOUS BLD VENIPUNCTURE: CPT

## 2022-06-10 PROCEDURE — 82306 VITAMIN D 25 HYDROXY: CPT

## 2022-06-10 PROCEDURE — 82607 VITAMIN B-12: CPT

## 2022-06-10 NOTE — TELEPHONE ENCOUNTER
RN called patient to let her know that the orders for Vitamin labs, let her know to check with insurance prior to appointment to see if labs were covered.     Hannah Soto RN

## 2022-08-02 ENCOUNTER — NURSE TRIAGE (OUTPATIENT)
Dept: FAMILY MEDICINE | Facility: CLINIC | Age: 27
End: 2022-08-02

## 2022-08-02 NOTE — TELEPHONE ENCOUNTER
Patient called into the clinic this morning as she is experiencing symptoms after having her Covid Booster 08/01/2022. Patient woke up this morning with chest pain and shortness of breath. Also experiencing fever, headache and body aches that have mostly gone away after taking Tylenol this morning. Due to chest pain and shortness of breath I encouraged patient to seek further evaluation whether that is urgent care or emergency room. Patient stating she has a safe ride to get there.     Hannah Soto RN    Reason for Disposition    Difficulty breathing    SEVERE chest pain    Fever > 100.4 F (38.0 C)    Patient wants to be seen    Protocols used: CHEST PAIN-A-[place]

## 2022-09-11 ENCOUNTER — HEALTH MAINTENANCE LETTER (OUTPATIENT)
Age: 27
End: 2022-09-11

## 2022-12-13 PROBLEM — M79.672 FOOT PAIN, BILATERAL: Status: RESOLVED | Noted: 2022-05-17 | Resolved: 2022-12-13

## 2022-12-13 PROBLEM — M79.671 FOOT PAIN, BILATERAL: Status: RESOLVED | Noted: 2022-05-17 | Resolved: 2022-12-13

## 2022-12-13 NOTE — PROGRESS NOTES
Patient seen for 2 PT visits. Patient did not return for further treatment and current status is unknown.  Please see initial evaluation and latest SOAP note for further information.

## 2023-07-29 ENCOUNTER — HEALTH MAINTENANCE LETTER (OUTPATIENT)
Age: 28
End: 2023-07-29

## 2024-09-15 ENCOUNTER — HEALTH MAINTENANCE LETTER (OUTPATIENT)
Age: 29
End: 2024-09-15

## (undated) RX ORDER — LIDOCAINE HYDROCHLORIDE 10 MG/ML
INJECTION, SOLUTION EPIDURAL; INFILTRATION; INTRACAUDAL; PERINEURAL
Status: DISPENSED
Start: 2022-05-12

## (undated) RX ORDER — TRIAMCINOLONE ACETONIDE 40 MG/ML
INJECTION, SUSPENSION INTRA-ARTICULAR; INTRAMUSCULAR
Status: DISPENSED
Start: 2022-05-12